# Patient Record
Sex: FEMALE | Race: BLACK OR AFRICAN AMERICAN
[De-identification: names, ages, dates, MRNs, and addresses within clinical notes are randomized per-mention and may not be internally consistent; named-entity substitution may affect disease eponyms.]

---

## 2017-08-12 ENCOUNTER — HOSPITAL ENCOUNTER (EMERGENCY)
Dept: HOSPITAL 61 - ER | Age: 42
LOS: 1 days | Discharge: HOME | End: 2017-08-13
Payer: SELF-PAY

## 2017-08-12 VITALS — WEIGHT: 120 LBS | HEIGHT: 60 IN | BODY MASS INDEX: 23.56 KG/M2

## 2017-08-12 DIAGNOSIS — R06.02: ICD-10-CM

## 2017-08-12 DIAGNOSIS — Z72.89: ICD-10-CM

## 2017-08-12 DIAGNOSIS — R00.0: ICD-10-CM

## 2017-08-12 DIAGNOSIS — R07.89: Primary | ICD-10-CM

## 2017-08-12 DIAGNOSIS — K29.70: ICD-10-CM

## 2017-08-12 DIAGNOSIS — I10: ICD-10-CM

## 2017-08-12 DIAGNOSIS — K21.9: ICD-10-CM

## 2017-08-12 LAB
ALBUMIN SERPL-MCNC: 4.5 G/DL (ref 3.4–5)
ALBUMIN/GLOB SERPL: 0.9 {RATIO} (ref 1–1.7)
ALP SERPL-CCNC: 58 U/L (ref 46–116)
ALT SERPL-CCNC: 90 U/L (ref 14–59)
ANION GAP SERPL CALC-SCNC: 16 MMOL/L (ref 6–14)
AST SERPL-CCNC: 102 U/L (ref 15–37)
BASOPHILS # BLD AUTO: 0 X10^3/UL (ref 0–0.2)
BASOPHILS NFR BLD: 1 % (ref 0–3)
BILIRUB SERPL-MCNC: 1.6 MG/DL (ref 0.2–1)
BUN SERPL-MCNC: 28 MG/DL (ref 7–20)
BUN/CREAT SERPL: 18 (ref 6–20)
CALCIUM SERPL-MCNC: 9.6 MG/DL (ref 8.5–10.1)
CHLORIDE SERPL-SCNC: 99 MMOL/L (ref 98–107)
CO2 SERPL-SCNC: 23 MMOL/L (ref 21–32)
CREAT SERPL-MCNC: 1.6 MG/DL (ref 0.6–1)
EOSINOPHIL NFR BLD: 1 % (ref 0–3)
ERYTHROCYTE [DISTWIDTH] IN BLOOD BY AUTOMATED COUNT: 14.7 % (ref 11.5–14.5)
GFR SERPLBLD BASED ON 1.73 SQ M-ARVRAT: 42.6 ML/MIN
GLOBULIN SER-MCNC: 5.1 G/DL (ref 2.2–3.8)
GLUCOSE SERPL-MCNC: 81 MG/DL (ref 70–99)
HCT VFR BLD CALC: 37.8 % (ref 36–47)
HGB BLD-MCNC: 13 G/DL (ref 12–15.5)
LYMPHOCYTES # BLD: 1.9 X10^3/UL (ref 1–4.8)
LYMPHOCYTES NFR BLD AUTO: 32 % (ref 24–48)
MCH RBC QN AUTO: 30 PG (ref 25–35)
MCHC RBC AUTO-ENTMCNC: 35 G/DL (ref 31–37)
MCV RBC AUTO: 88 FL (ref 79–100)
MONOCYTES NFR BLD: 16 % (ref 0–9)
NEUTROPHILS NFR BLD AUTO: 50 % (ref 31–73)
PLATELET # BLD AUTO: 384 X10^3/UL (ref 140–400)
POTASSIUM SERPL-SCNC: 4.3 MMOL/L (ref 3.5–5.1)
PROT SERPL-MCNC: 9.6 G/DL (ref 6.4–8.2)
RBC # BLD AUTO: 4.29 X10^6/UL (ref 3.5–5.4)
SODIUM SERPL-SCNC: 138 MMOL/L (ref 136–145)
WBC # BLD AUTO: 6.1 X10^3/UL (ref 4–11)

## 2017-08-12 PROCEDURE — 93005 ELECTROCARDIOGRAM TRACING: CPT

## 2017-08-12 PROCEDURE — 85025 COMPLETE CBC W/AUTO DIFF WBC: CPT

## 2017-08-12 PROCEDURE — 99285 EMERGENCY DEPT VISIT HI MDM: CPT

## 2017-08-12 PROCEDURE — 84484 ASSAY OF TROPONIN QUANT: CPT

## 2017-08-12 PROCEDURE — 36415 COLL VENOUS BLD VENIPUNCTURE: CPT

## 2017-08-12 PROCEDURE — 71010: CPT

## 2017-08-12 PROCEDURE — S0028 INJECTION, FAMOTIDINE, 20 MG: HCPCS

## 2017-08-12 PROCEDURE — 96374 THER/PROPH/DIAG INJ IV PUSH: CPT

## 2017-08-12 PROCEDURE — 83690 ASSAY OF LIPASE: CPT

## 2017-08-12 PROCEDURE — 80053 COMPREHEN METABOLIC PANEL: CPT

## 2017-08-13 VITALS — SYSTOLIC BLOOD PRESSURE: 127 MMHG | DIASTOLIC BLOOD PRESSURE: 75 MMHG

## 2017-08-13 NOTE — EKG
[facility]

              [address] [place] [address]

Test Date:    2017               Test Time:    22:05:57

Pat Name:     SUDHIR BASHIR          Department:   

Patient ID:   [id number]           Room:          

Gender:       F                        Technician:   

:          1975               Requested By: JAYLEN QUINTANA

Order Number: [id number]            Reading MD:     

                                 Measurements

Intervals                              Axis          

Rate:         121                      P:            3

MO:           108                      QRS:          6

QRSD:         62                       T:            34

QT:           314                                    

QTc:          449                                    

                           Interpretive Statements

SINUS TACHYCARDIA

LEFT ATRIAL ABNORMALITY

RI6.01          Unconfirmed report

No previous ECG available for comparison

## 2017-08-13 NOTE — PHYS DOC
Past Medical History


Past Medical History:  GERD, Hypertension


Additional Past Medical Histor:  daily etoh (1/2 bottle cognac)


Past Surgical History:  No Surgical History


Alcohol Use:  Heavy


Additional Information:  


drinks 1/2 bottle of cognac daily (08/2017)


Drug Use:  None





Adult General


Chief Complaint


Chief Complaint:  CHEST PAIN





HPI


HPI





Patient is a 42  year old female who presents here today complaining of chest 

pain that started approximate 7 PM. Patient has any shortness of breath. 

Patient portion had 4 episodes of nausea and vomiting. Patient describes the 

pain as midepigastric. Patient has any diarrhea. Patient has any coughing. 

Patient has any radiating pain to her jaw back or shoulders. Patient reports 

the pain is not exertional in nature. Patient reports no change with rest. 

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 

Patient reports no increase exertion. Patient reports that the pain started 

after eating [employer]. Patient reports she has a history of GERD, 

hypertension,. Patient denies any history of diabetes liver longer kidney pals. 

Patient has any prior strokes or MIs in the past. Patient has any surgeries. 

Patient reports that she drinks half a pint cognac every day. Patient reports 

that she did feel well today so did not drink or cognac except had one can of 

beer at approximately noon time. Patient reports the pain did not start in her 

midepigastric area up until 7 PM. Patient denies any no known drug allergies. 

Patient is not do any drugs. Patient reports she does smoke and does drink 

heavily. Patient has no history of seizures or alcohol withdrawal in the past.





Review of systems:


Constitutional: Denies fever or chills          


Eyes: Denies change in visual acuity, redness, or eye pain   


HENT: Denies nasal congestion or sore throat 


All other review systems are negative except as documented in the history of 

present illness portion.








Physical exam:


Constitutional: Well developed, well nourished, no acute distress, non-toxic 

appearance. 


HENT: Normocephalic, atraumatic, bilateral external ears normal, 


Eyes:  EOMI, conjunctiva normal, no discharge.  


Neck: Normal range of motion, no tenderness, supple, no stridor.  


Cardiovascular: Regular rate


Lungs & Thorax:  Bilateral breath sounds clear to auscultation no wheezing 

rales or rhonchi


Abdomen: Bowel sounds normal, soft, no tenderness, 


Skin: Warm, dry, no erythema, no rash.  


Back: No tenderness, no CVA tenderness.  


Extremities:  ROM intact, no edema.  


Neurologic: Alert and oriented X 3, normal motor function, normal sensory 

function, no focal deficits noted. 


Psychologic: Affect normal, judgement normal, mood normal. 





Patient's ER workup consisted of normal labs. 


Patient's EKG revealed normal sinus rhythm at a heart rate of 128 upon arrival 

to the ER. Patient nonspecific ST-T wave abnormality. There was no evidence of 

ST elevation MI. 


Patient had a repeat EKG performed at 12:34 AM. Patient's repeat EKG revealed 

sinus rhythm at a heart rate of 97 with nonspecific ST-T wave abnormalities. 

There was no evidence of ST elevation at that time. Both EKGs interpreted by ED 

physician.


Patient's initial troponin was 0.017. Patient's repeat troponin was also less 

than 0.017.





Patient's ER physical exam was remarkable for an initial tachycardia at a heart 

rate of 115. Patient's heart was regular rate and rhythm after being in the ED 

for approximately 30 minutes. Patient's heart rate came down to 92. Patient's 

lungs were clear without any wheezing rales or rhonchi. Patient does have 

reproducible tenderness to palpation to her mid sternum and left chest. Patient 

has reproducible tenderness to palpation to her midepigastric region. Patient 

has no right upper quadrant tenderness to palpation. Patient has no Ochoa 

sign. Patient's abdomen was soft nontender no rebound or guarding. Patient was 

alert awake and oriented 3. There were no signs or symptoms of alcohol 

withdrawal. Patient did not have any tremor.





While the ER the patient was given a GI cocktail which she reports immediately 

resolved her discomfort. Patient reports the pain she is having today is very 

similar to her GERD pain however it was more intense than usual. Patient 

reports the pain occurred right after they [employer] which is 

common for her to have reflux after eating fried food.





Patient was offered admission to the hospital however she feels that the pain 

that she's having is not consistent with her heart and she feels that she will 

be okay to go home. Patient reports that she had a stress test approximately 5 

years ago. I did discuss with the patient given the length of the stress test 

that it likely would not contribute to my feel comfortable sending her home. 

Utilizing shared decision making with the patient, we've agreed to repeat her 

blood tests and of her labs are okay she would still feel comfortable going 

home.





Assessment and plan





This is a 42-year-old female who presents to the ER with atypical chest pain. 

Patient is most consistent with her GERD-type pain. Patient is low risk for 

cardiac etiology for chest pain. Patient had repeat troponin and EKGs performed 

which did not show any changes. Patient feels much improved after the GI 

cocktail and feels comfortable going home. Patient be sent home with 

prescription for Prilosec to take and will be instructed to return the ER if 

she changes her mind or has any further concerns regarding her pain.





Current Medications


Current Medications





Current Medications








 Medications


  (Trade)  Dose


 Ordered  Sig/Hilaria  Start Time


 Stop Time Status Last Admin


Dose Admin


 


 Famotidine


  (Pepcid)  20 mg  1X  ONCE  8/12/17 23:30


 8/12/17 23:31 DC 8/12/17 23:47


20 MG


 


 Multi-Ingredient


 Mouthwash/Gargle


  (Gi Cocktail


 Single Dose)  15 ml  1X  ONCE  8/12/17 23:30


 8/12/17 23:31 DC 8/12/17 23:46


15 ML











Allergies


Allergies





Allergies








Coded Allergies Type Severity Reaction Last Updated Verified


 


  No Known Drug Allergies    1/29/14 No











Current Patient Data


Vital Signs





 Vital Signs








  Date Time  Temp Pulse Resp B/P (MAP) Pulse Ox O2 Delivery O2 Flow Rate FiO2


 


8/12/17 23:44  96 20 132/91 (105) 96 Room Air  


 


8/12/17 22:06 99.4       





 99.4       








Lab Values





 Laboratory Tests








Test


  8/12/17


23:05 8/13/17


00:37


 


White Blood Count


  6.1 x10^3/uL


(4.0-11.0) 


 


 


Red Blood Count


  4.29 x10^6/uL


(3.50-5.40) 


 


 


Hemoglobin


  13.0 g/dL


(12.0-15.5) 


 


 


Hematocrit


  37.8 %


(36.0-47.0) 


 


 


Mean Corpuscular Volume


  88 fL ()


  


 


 


Mean Corpuscular Hemoglobin 30 pg (25-35)   


 


Mean Corpuscular Hemoglobin


Concent 35 g/dL


(31-37) 


 


 


Red Cell Distribution Width


  14.7 %


(11.5-14.5)  H 


 


 


Platelet Count


  384 x10^3/uL


(140-400) 


 


 


Neutrophils (%) (Auto) 50 % (31-73)   


 


Lymphocytes (%) (Auto) 32 % (24-48)   


 


Monocytes (%) (Auto) 16 % (0-9)  H 


 


Eosinophils (%) (Auto) 1 % (0-3)   


 


Basophils (%) (Auto) 1 % (0-3)   


 


Neutrophils # (Auto)


  3.1 x10^3uL


(1.8-7.7) 


 


 


Lymphocytes # (Auto)


  1.9 x10^3/uL


(1.0-4.8) 


 


 


Monocytes # (Auto)


  1.0 x10^3/uL


(0.0-1.1) 


 


 


Eosinophils # (Auto)


  0.1 x10^3/uL


(0.0-0.7) 


 


 


Basophils # (Auto)


  0.0 x10^3/uL


(0.0-0.2) 


 


 


Sodium Level


  138 mmol/L


(136-145) 


 


 


Potassium Level


  4.3 mmol/L


(3.5-5.1) 


 


 


Chloride Level


  99 mmol/L


() 


 


 


Carbon Dioxide Level


  23 mmol/L


(21-32) 


 


 


Anion Gap 16 (6-14)  H 


 


Blood Urea Nitrogen


  28 mg/dL


(7-20)  H 


 


 


Creatinine


  1.6 mg/dL


(0.6-1.0)  H 


 


 


Estimated GFR


(Cockcroft-Gault) 42.6  


  


 


 


BUN/Creatinine Ratio 18 (6-20)   


 


Glucose Level


  81 mg/dL


(70-99) 


 


 


Calcium Level


  9.6 mg/dL


(8.5-10.1) 


 


 


Total Bilirubin


  1.6 mg/dL


(0.2-1.0)  H 


 


 


Aspartate Amino Transferase


(AST) 102 U/L


(15-37)  H 


 


 


Alanine Aminotransferase (ALT)


  90 U/L (14-59)


H 


 


 


Alkaline Phosphatase


  58 U/L


() 


 


 


Troponin I Quantitative


  < 0.017 ng/mL


(0.000-0.055) < 0.017 ng/mL


(0.000-0.055)


 


Total Protein


  9.6 g/dL


(6.4-8.2)  H 


 


 


Albumin


  4.5 g/dL


(3.4-5.0) 


 


 


Albumin/Globulin Ratio


  0.9 (1.0-1.7)


L 


 


 


Lipase


  126 U/L


() 


 


 


Ethyl Alcohol Level


  < 10 mg/dL


(0-10) 


 





 Laboratory Tests


8/12/17 23:05








 Laboratory Tests


8/12/17 23:05














EKG


EKG


[]





Radiology/Procedures


Radiology/Procedures


[]





Course & Med Decision Making


Course & Med Decision Making


Pertinent Labs and Imaging studies reviewed. (See chart for details)





[]





Dragon Disclaimer


Dragon Disclaimer


This electronic medical record was generated, in whole or in part, using a 

voice recognition dictation system.





Departure


Departure


Impression:  


 Primary Impression:  


 Abdominal pain


 Additional Impressions:  


 Nonspecific chest pain


 Gastritis


 Alcohol use disorder


Disposition:  01 HOME, SELF-CARE


Condition:  IMPROVED


Referrals:  


UNKNOWN PCP NAME (PCP)


Patient Instructions:  Alcoholic Gastritis-Brief, Chest Pain (Nonspecific), 

Diet for Gastroesophageal Reflux Disease, Adult, Gastritis, Adult





Additional Instructions:  


Please follow up with your family doctor for further evaluation of your pain 

and for referral to an alcohol rehabilitation facility. He needs to stop 

drinking so much alcohol. He change her diet. Given given instructions 

regarding diet for gastroesophageal reflux disorder.


Scripts


Omeprazole Magnesium (PRILOSEC OTC) 20 Mg Tablet.dr


1 TAB PO DAILY, #30 TAB 3 Refills


   Prov: JAYLEN QUINTANA MD         8/13/17





Problem Qualifiers











JAYLEN QUINTANA MD Aug 13, 2017 01:41

## 2017-08-13 NOTE — RAD
AP portable chest  radiograph August 12, 2017



Clinical History: Chest pain.



An AP portable erect digital radiograph of the chest was obtained. Comparison

study is dated 6/8/2012.



The cardiac silhouette is normal in size. The thoracic aorta is mildly

tortuous. No acute pulmonary infiltrate is seen. No pleural effusion or

pneumothorax is noted. The osseous structures are grossly intact.



Impression: No acute abnormality is seen.

## 2017-08-14 NOTE — EKG
[facility]

               [address] [place] [address]

Test Date:    2017               Test Time:    00:34:36

Pat Name:     SUDHIR BASHIR          Department:   

Patient ID:   [id number]           Room:          

Gender:       F                        Technician:   

:          1975               Requested By: JAYLEN QUINTANA

Order Number: [id number]            Reading MD:     

                                 Measurements

Intervals                              Axis          

Rate:         97                       P:            29

TN:           130                      QRS:          -19

QRSD:         66                       T:            20

QT:           344                                    

QTc:          441                                    

                           Interpretive Statements

SINUS RHYTHM

LEFT ATRIAL ABNORMALITY

LEFTWARD AXIS

R-S TRANSITION ZONE IN V LEADS DISPLACED TO THE LEFT

RI6.01          Unconfirmed report

No previous ECG available for comparison

## 2017-08-14 NOTE — EKG
[facility]

               [address] [place] [address]

Test Date:    2017               Test Time:    22:07:07

Pat Name:     SUDHIR BASHIR          Department:   

Patient ID:   [id number]           Room:          

Gender:       F                        Technician:   

:          1975               Requested By: JAYLEN QUINTANA

Order Number: [id number]            Reading MD:     

                                 Measurements

Intervals                              Axis          

Rate:         128                      P:            -31

NE:           102                      QRS:          4

QRSD:         60                       T:            39

QT:           308                                    

QTc:          453                                    

                           Interpretive Statements

SINUS TACHYCARDIA

RI6.01          Unconfirmed report

No previous ECG available for comparison

## 2020-07-12 ENCOUNTER — HOSPITAL ENCOUNTER (EMERGENCY)
Dept: HOSPITAL 61 - ER | Age: 45
Discharge: HOME | End: 2020-07-12
Payer: SELF-PAY

## 2020-07-12 ENCOUNTER — HOSPITAL ENCOUNTER (EMERGENCY)
Dept: HOSPITAL 61 - ER | Age: 45
Discharge: LEFT BEFORE DISCHARGE | End: 2020-07-12
Payer: SELF-PAY

## 2020-07-12 VITALS — BODY MASS INDEX: 24.97 KG/M2 | HEIGHT: 60 IN | WEIGHT: 127.21 LBS

## 2020-07-12 VITALS — DIASTOLIC BLOOD PRESSURE: 89 MMHG | SYSTOLIC BLOOD PRESSURE: 135 MMHG

## 2020-07-12 VITALS — BODY MASS INDEX: 25.97 KG/M2 | WEIGHT: 132.28 LBS | HEIGHT: 60 IN

## 2020-07-12 VITALS — DIASTOLIC BLOOD PRESSURE: 91 MMHG | SYSTOLIC BLOOD PRESSURE: 141 MMHG

## 2020-07-12 DIAGNOSIS — E87.6: ICD-10-CM

## 2020-07-12 DIAGNOSIS — R00.0: ICD-10-CM

## 2020-07-12 DIAGNOSIS — F41.9: Primary | ICD-10-CM
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
## 2020-07-12 DIAGNOSIS — I10: ICD-10-CM
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
## 2020-07-12 DIAGNOSIS — F17.200: ICD-10-CM

## 2020-07-12 DIAGNOSIS — F10.20: ICD-10-CM

## 2020-07-12 DIAGNOSIS — K21.9: ICD-10-CM

## 2020-07-12 DIAGNOSIS — Z53.21: ICD-10-CM

## 2020-07-12 DIAGNOSIS — F10.129: ICD-10-CM

## 2020-07-12 DIAGNOSIS — R06.4: Primary | ICD-10-CM

## 2020-07-12 LAB
ALBUMIN SERPL-MCNC: 4.1 G/DL (ref 3.4–5)
ALBUMIN/GLOB SERPL: 0.8 {RATIO} (ref 1–1.7)
ALP SERPL-CCNC: 93 U/L (ref 46–116)
ALT SERPL-CCNC: 45 U/L (ref 14–59)
ANION GAP SERPL CALC-SCNC: 15 MMOL/L (ref 6–14)
AST SERPL-CCNC: 42 U/L (ref 15–37)
BASOPHILS # BLD AUTO: 0.1 X10^3/UL (ref 0–0.2)
BASOPHILS NFR BLD: 1 % (ref 0–3)
BILIRUB SERPL-MCNC: 1.2 MG/DL (ref 0.2–1)
BUN SERPL-MCNC: 15 MG/DL (ref 7–20)
BUN/CREAT SERPL: 13 (ref 6–20)
CALCIUM SERPL-MCNC: 9.9 MG/DL (ref 8.5–10.1)
CHLORIDE SERPL-SCNC: 96 MMOL/L (ref 98–107)
CO2 SERPL-SCNC: 25 MMOL/L (ref 21–32)
CREAT SERPL-MCNC: 1.2 MG/DL (ref 0.6–1)
EOSINOPHIL NFR BLD: 0.1 X10^3/UL (ref 0–0.7)
EOSINOPHIL NFR BLD: 1 % (ref 0–3)
ERYTHROCYTE [DISTWIDTH] IN BLOOD BY AUTOMATED COUNT: 13.9 % (ref 11.5–14.5)
GFR SERPLBLD BASED ON 1.73 SQ M-ARVRAT: 58.5 ML/MIN
GLOBULIN SER-MCNC: 5.3 G/DL (ref 2.2–3.8)
GLUCOSE SERPL-MCNC: 91 MG/DL (ref 70–99)
HCT VFR BLD CALC: 42.7 % (ref 36–47)
HGB BLD-MCNC: 14.9 G/DL (ref 12–15.5)
LIPASE: 66 U/L (ref 73–393)
LYMPHOCYTES # BLD: 2.1 X10^3/UL (ref 1–4.8)
LYMPHOCYTES NFR BLD AUTO: 22 % (ref 24–48)
MCH RBC QN AUTO: 30 PG (ref 25–35)
MCHC RBC AUTO-ENTMCNC: 35 G/DL (ref 31–37)
MCV RBC AUTO: 86 FL (ref 79–100)
MONO #: 1.2 X10^3/UL (ref 0–1.1)
MONOCYTES NFR BLD: 13 % (ref 0–9)
NEUT #: 6 X10^3/UL (ref 1.8–7.7)
NEUTROPHILS NFR BLD AUTO: 64 % (ref 31–73)
PLATELET # BLD AUTO: 481 X10^3/UL (ref 140–400)
POTASSIUM SERPL-SCNC: 3.3 MMOL/L (ref 3.5–5.1)
PROT SERPL-MCNC: 9.4 G/DL (ref 6.4–8.2)
RBC # BLD AUTO: 4.97 X10^6/UL (ref 3.5–5.4)
SODIUM SERPL-SCNC: 136 MMOL/L (ref 136–145)
WBC # BLD AUTO: 9.5 X10^3/UL (ref 4–11)

## 2020-07-12 PROCEDURE — 83690 ASSAY OF LIPASE: CPT

## 2020-07-12 PROCEDURE — 80053 COMPREHEN METABOLIC PANEL: CPT

## 2020-07-12 PROCEDURE — 96376 TX/PRO/DX INJ SAME DRUG ADON: CPT

## 2020-07-12 PROCEDURE — 85025 COMPLETE CBC W/AUTO DIFF WBC: CPT

## 2020-07-12 PROCEDURE — 96374 THER/PROPH/DIAG INJ IV PUSH: CPT

## 2020-07-12 PROCEDURE — 99285 EMERGENCY DEPT VISIT HI MDM: CPT

## 2020-07-12 PROCEDURE — 36415 COLL VENOUS BLD VENIPUNCTURE: CPT

## 2020-07-12 NOTE — PHYS DOC
Past Medical History


Past Medical History:  GERD, Hypertension


Additional Past Medical Histor:  daily etoh (1/2 bottle cognac)


Past Surgical History:  No Surgical History


Smoking Status:  Current Every Day Smoker


Alcohol Use:  Heavy


Drug Use:  None





General Adult


EDM:


Chief Complaint:  ANXIETY/PANIC ATTACK





HPI:


HPI:





Patient is a 45  year old female presents for the evaluation of anxiety and 

muscle spasms.  Patient states muscle spasms onset this afternoon bilateral 

hands.





Patient is a daily drinker.  She states her last alcoholic drink was around 4 AM

this morning.





On exam patient's heart rate is in the 120s.  She does not currently have any 

shakes or spasms.








Patient tells me she has no interest to stop drinking.





Review of Systems:


Review of Systems:


Constitutional:   Denies fever or chills. []


Eyes:   Denies change in visual acuity. []


HENT:   Denies nasal congestion or sore throat. [] 


Respiratory:   Denies cough or shortness of breath. [] 


Cardiovascular:   Denies chest pain or edema. [] 


GI:   Denies abdominal pain, nausea, vomiting, bloody stools or diarrhea. [] 


:  Denies dysuria. [] 


Musculoskeletal:   Denies back pain or joint pain. [] 


Integument:   Denies rash. [] 


Neurologic:   Denies headache, focal weakness or sensory changes. [] 


Endocrine:   Denies polyuria or polydipsia. [] 


Lymphatic:  Denies swollen glands. [] 


Psychiatric:  Denies depression or anxiety. []





Heart Score:


Risk Factors:


Risk Factors:  DM, Current or recent (<one month) smoker, HTN, HLP, family 

history of CAD, obesity.


Risk Scores:


Score 0 - 3:  2.5% MACE over next 6 weeks - Discharge Home


Score 4 - 6:  20.3% MACE over next 6 weeks - Admit for Clinical Observation


Score 7 - 10:  72.7% MACE over next 6 weeks - Early Invasive Strategies





Current Medications:





Current Medications








 Medications


  (Trade)  Dose


 Ordered  Sig/Hilaria  Start Time


 Stop Time Status Last Admin


Dose Admin


 


 Lorazepam


  (Ativan Inj)  1 mg  1X  ONCE  20 19:30


 20 19:31 DC 20 19:24


1 MG


 


 Sodium Chloride  1,000 ml @ 


 1,000 mls/hr  1X  ONCE  20 19:00


 20 19:59 DC 20 19:00


1,000 MLS/HR











Allergies:


Allergies:





Allergies








Coded Allergies Type Severity Reaction Last Updated Verified


 


  No Known Drug Allergies    14 No











Physical Exam:


PE:





Constitutional: Well developed, well nourished, no acute distress, non-toxic 

appearance. []


HENT: Normocephalic, atraumatic, bilateral external ears normal, oropharynx 

moist, no oral exudates, nose normal. []


Eyes: , EOMI, conjunctiva normal, no discharge. [] 


Neck: Normal range of motion, no tenderness, supple, no stridor. [] 


Cardiovascular: Tachycardia


Lungs & Thorax:  Bilateral breath sounds clear to auscultation []


Abdomen: Bowel sounds normal, soft, no tenderness, no masses, no pulsatile 

masses. [] 


Skin: Warm, dry, no erythema, no rash. [] 


Back: No tenderness, no CVA tenderness. [] 


Extremities: No tenderness, no cyanosis, no clubbing, ROM intact, no edema. [] 


Neurologic: Alert and oriented X 3, normal motor function, normal sensory 

function, no focal deficits noted. []


Psychologic: Affect normal, judgement normal, mood normal. []





Current Patient Data:


Labs:





                                Laboratory Tests








Test


 20


18:50


 


White Blood Count


 9.5 x10^3/uL


(4.0-11.0)


 


Red Blood Count


 4.97 x10^6/uL


(3.50-5.40)


 


Hemoglobin


 14.9 g/dL


(12.0-15.5)


 


Hematocrit


 42.7 %


(36.0-47.0)


 


Mean Corpuscular Volume


 86 fL ()





 


Mean Corpuscular Hemoglobin 30 pg (25-35)  


 


Mean Corpuscular Hemoglobin


Concent 35 g/dL


(31-37)


 


Red Cell Distribution Width


 13.9 %


(11.5-14.5)


 


Platelet Count


 481 x10^3/uL


(140-400)  H


 


Neutrophils (%) (Auto) 64 % (31-73)  


 


Lymphocytes (%) (Auto) 22 % (24-48)  L


 


Monocytes (%) (Auto) 13 % (0-9)  H


 


Eosinophils (%) (Auto) 1 % (0-3)  


 


Basophils (%) (Auto) 1 % (0-3)  


 


Neutrophils # (Auto)


 6.0 x10^3/uL


(1.8-7.7)


 


Lymphocytes # (Auto)


 2.1 x10^3/uL


(1.0-4.8)


 


Monocytes # (Auto)


 1.2 x10^3/uL


(0.0-1.1)  H


 


Eosinophils # (Auto)


 0.1 x10^3/uL


(0.0-0.7)


 


Basophils # (Auto)


 0.1 x10^3/uL


(0.0-0.2)


 


Sodium Level


 136 mmol/L


(136-145)


 


Potassium Level


 3.3 mmol/L


(3.5-5.1)  L


 


Chloride Level


 96 mmol/L


()  L


 


Carbon Dioxide Level


 25 mmol/L


(21-32)


 


Anion Gap 15 (6-14)  H


 


Blood Urea Nitrogen


 15 mg/dL


(7-20)


 


Creatinine


 1.2 mg/dL


(0.6-1.0)  H


 


Estimated GFR


(Cockcroft-Gault) 58.5  





 


BUN/Creatinine Ratio 13 (6-20)  


 


Glucose Level


 91 mg/dL


(70-99)


 


Calcium Level


 9.9 mg/dL


(8.5-10.1)


 


Total Bilirubin


 1.2 mg/dL


(0.2-1.0)  H


 


Aspartate Amino Transferase


(AST) 42 U/L (15-37)


H


 


Alanine Aminotransferase (ALT)


 45 U/L (14-59)





 


Alkaline Phosphatase


 93 U/L


()


 


Total Protein


 9.4 g/dL


(6.4-8.2)  H


 


Albumin


 4.1 g/dL


(3.4-5.0)


 


Albumin/Globulin Ratio


 0.8 (1.0-1.7)


L


 


Lipase


 66 U/L


()  L


 


Ethyl Alcohol Level


 < 10 mg/dL


(0-10)





                                Laboratory Tests


20 18:50








                                Laboratory Tests


20 18:50








Vital Signs:





                                   Vital Signs








  Date Time  Temp Pulse Resp B/P (MAP) Pulse Ox O2 Delivery O2 Flow Rate FiO2


 


20 18:46 99.7 136 20 147/90 (109) 98 Room Air  





 99.7       











EKG:


EKhrs


heart rate 130


sinus tachycardia


[]





Radiology/Procedures:


Radiology/Procedures:


[]





Course & Med Decision Making:


Course & Med Decision Making


Pertinent Labs and Imaging studies reviewed. (See chart for details)





[] Was evaluated for chief complaint.  Work-up consisted of laboratory analysis 

and EKG.  Results reviewed and discussed with patient.  Patient noted to have 

several abnormal labs including a low potassium and slightly elevated 

creatinine.


Patient alcohol level was less than 10.  Patient states the last time she drank 

was 4 AM this morning.  EKG sinus tachycardia.  Patient denies any chest pain.  

Treatment included 2 L of IV fluids and a total of 2 mg of Ativan.


Reevaluation patient tells me she has no interest in not drinking anymore.  Her 

heart rate is improved to the 110s.  Patient is no longer having spasms of his 

of her hands.


Did discuss the possibility of her signs and symptoms being related to alcohol 

withdrawal.





Dragon Disclaimer:


Dragon Disclaimer:


This electronic medical record was generated, in whole or in part, using a voice

 recognition dictation system.





Departure


Departure


Impression:  


   Primary Impression:  


   Anxiety


   Additional Impressions:  


   Hypokalemia


   Alcohol abuse


Disposition:  01 HOME, SELF-CARE


Referrals:  


UNKNOWN PCP NAME (PCP)


Patient Instructions:  Anxiety and Panic Attacks, Hypokalemia


Scripts


Lorazepam (ATIVAN) 1 Mg Tablet


1 MG PO TID, #14 TAB


   Prov: PJ MAE DO         20 


Potassium Chloride (POTASSIUM CHLORIDE **) 20 Meq Tablet.er


20 MEQ PO DAILY for SUPPLEMENT for 10 Days, #10 TAB.SR


   Prov: PJ MAE DO         20





Justicifation of Admission Dx:


Justifications for Admission:


Justification of Admission Dx:  N/A











PJ MAE DO            2020 20:26

## 2020-08-16 ENCOUNTER — HOSPITAL ENCOUNTER (EMERGENCY)
Dept: HOSPITAL 61 - ER | Age: 45
Discharge: HOME | End: 2020-08-16
Payer: SELF-PAY

## 2020-08-16 VITALS — WEIGHT: 125.66 LBS | HEIGHT: 60 IN | BODY MASS INDEX: 24.67 KG/M2

## 2020-08-16 VITALS — SYSTOLIC BLOOD PRESSURE: 132 MMHG | DIASTOLIC BLOOD PRESSURE: 95 MMHG

## 2020-08-16 DIAGNOSIS — F10.10: ICD-10-CM

## 2020-08-16 DIAGNOSIS — S01.511D: Primary | ICD-10-CM

## 2020-08-16 DIAGNOSIS — F41.9: ICD-10-CM

## 2020-08-16 DIAGNOSIS — F17.200: ICD-10-CM

## 2020-08-16 DIAGNOSIS — K21.9: ICD-10-CM
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
## 2020-08-16 DIAGNOSIS — I10: ICD-10-CM

## 2020-08-16 PROCEDURE — 99282 EMERGENCY DEPT VISIT SF MDM: CPT

## 2020-08-16 NOTE — PHYS DOC
Past Medical History


Past Medical History:  Anxiety, GERD, Hypertension


Additional Past Medical Histor:  daily etoh (1/2 bottle cognac)


Past Surgical History:  No Surgical History


Smoking Status:  Current Every Day Smoker


Alcohol Use:  Heavy


Drug Use:  None





General Adult


EDM:


Chief Complaint:  SUTURE/STAPLE REMOVAL





HPI:


HPI:





Patient is a 45  year old AA female who presents to the emergency department for

suture removal.  She reports that she had 6 or 7 sutures placed in her left 

upper lip a week ago at [facility].  She denies any fever, drainage, 

bleeding, redness, or warmth at the incision site.  She currently denies any 

pain.





Review of Systems:


Review of Systems:


Constitutional:   Denies fever or chills. []


Integument:   See HPI


Neurologic:   Denies headache


Psychiatric:  Denies depression or anxiety. []





Heart Score:


Risk Factors:


Risk Factors:  DM, Current or recent (<one month) smoker, HTN, HLP, family 

history of CAD, obesity.


Risk Scores:


Score 0 - 3:  2.5% MACE over next 6 weeks - Discharge Home


Score 4 - 6:  20.3% MACE over next 6 weeks - Admit for Clinical Observation


Score 7 - 10:  72.7% MACE over next 6 weeks - Early Invasive Strategies





Allergies:


Allergies:





Allergies








Coded Allergies Type Severity Reaction Last Updated Verified


 


  No Known Drug Allergies    1/29/14 No











Physical Exam:


PE:





Constitutional: Well developed, well nourished, no acute distress, non-toxic 

appearance. []


HENT: Normocephalic, atraumatic, bilateral external ears normal, nose normal. []


Eyes: PERRLA, EOMI, conjunctiva normal, no discharge. [] 


Neck: Normal range of motion, no stridor. [] 


Cardiovascular:Heart rate regular rhythm


Lungs & Thorax:  Respirations even and unlabored, no retractions, no respiratory

 distress


Skin: Warm, dry, no erythema, no rash; healed laceration to left upper lip 

without surrounding erythema, warmth, drainage, or bleeding.  Edges are well 

approximated, 6 sutures in place.  [] 


Extremities: No cyanosis, ROM intact, no edema. [] 


Neurologic: Alert and oriented X 3, no focal deficits noted. []


Psychologic: Affect normal, judgement normal, mood normal. []





Current Patient Data:


Vital Signs:





                                   Vital Signs








  Date Time  Temp Pulse Resp B/P (MAP) Pulse Ox O2 Delivery O2 Flow Rate FiO2


 


8/16/20 14:03 98.9 108 16 132/95 (107) 94 Room Air  





 98.9       











EKG:


EKG:


[]





Radiology/Procedures:


Radiology/Procedures:


6 sutures were [] removed from wound. The wound was well approximated before and

 after procedure. Patient tolerated the procedure well. There is some mild 

crusting about the wound and no discharge or bleeding from the wound. []





Course & Med Decision Making:


Course & Med Decision Making


Pertinent Labs and Imaging studies reviewed. (See chart for details)





[]





Dragon Disclaimer:


Dragon Disclaimer:


This electronic medical record was generated, in whole or in part, using a voice

 recognition dictation system.





Departure


Departure


Impression:  


   Primary Impression:  


   Encounter for removal of sutures


Disposition:  01 HOME, SELF-CARE


Condition:  STABLE


Referrals:  


UNKNOWN PCP NAME (PCP)


Patient Instructions:  Suture Removal-Brief





Additional Instructions:  


Be sure to apply sunblock to the affected area to help reduce scarring, you may 

also purchase some vitamin E capsules and put vitamin E over the affected area 

twice a day to help reduce scarring.  Follow-up with your primary care doctor as

 needed.  Return to the ER if symptoms worsen or you develop a fever.





Justicifation of Admission Dx:


Justifications for Admission:


Justification of Admission Dx:  N/A











JOSÉ LUIS SPICER       Aug 16, 2020 14:25

## 2022-01-31 ENCOUNTER — HOSPITAL ENCOUNTER (OUTPATIENT)
Dept: HOSPITAL 61 - ER | Age: 47
Setting detail: OBSERVATION
LOS: 1 days | Discharge: HOME | End: 2022-02-01
Attending: INTERNAL MEDICINE | Admitting: INTERNAL MEDICINE
Payer: SELF-PAY

## 2022-01-31 VITALS — SYSTOLIC BLOOD PRESSURE: 142 MMHG | DIASTOLIC BLOOD PRESSURE: 92 MMHG

## 2022-01-31 VITALS — DIASTOLIC BLOOD PRESSURE: 94 MMHG | SYSTOLIC BLOOD PRESSURE: 161 MMHG

## 2022-01-31 VITALS — HEIGHT: 60 IN | WEIGHT: 130.07 LBS | BODY MASS INDEX: 25.54 KG/M2

## 2022-01-31 DIAGNOSIS — I10: ICD-10-CM

## 2022-01-31 DIAGNOSIS — I20.9: ICD-10-CM

## 2022-01-31 DIAGNOSIS — R79.89: ICD-10-CM

## 2022-01-31 DIAGNOSIS — Z79.899: ICD-10-CM

## 2022-01-31 DIAGNOSIS — Z98.890: ICD-10-CM

## 2022-01-31 DIAGNOSIS — R07.89: Primary | ICD-10-CM

## 2022-01-31 DIAGNOSIS — F17.210: ICD-10-CM

## 2022-01-31 DIAGNOSIS — E83.42: ICD-10-CM

## 2022-01-31 DIAGNOSIS — F10.10: ICD-10-CM

## 2022-01-31 DIAGNOSIS — K21.9: ICD-10-CM

## 2022-01-31 DIAGNOSIS — F41.9: ICD-10-CM

## 2022-01-31 DIAGNOSIS — Z20.822: ICD-10-CM

## 2022-01-31 DIAGNOSIS — E87.6: ICD-10-CM

## 2022-01-31 DIAGNOSIS — G47.00: ICD-10-CM

## 2022-01-31 LAB
ALBUMIN SERPL-MCNC: 3.5 G/DL (ref 3.4–5)
ALBUMIN/GLOB SERPL: 0.7 {RATIO} (ref 1–1.7)
ALP SERPL-CCNC: 85 U/L (ref 46–116)
ALT SERPL-CCNC: 106 U/L (ref 14–59)
AMPHETAMINE/METHAMPHETAMINE: (no result)
ANION GAP SERPL CALC-SCNC: 11 MMOL/L (ref 6–14)
APTT PPP: YELLOW S
AST SERPL-CCNC: 92 U/L (ref 15–37)
BACTERIA #/AREA URNS HPF: (no result) /HPF
BARBITURATES UR-MCNC: (no result) UG/ML
BASOPHILS # BLD AUTO: 0 X10^3/UL (ref 0–0.2)
BASOPHILS NFR BLD: 1 % (ref 0–3)
BENZODIAZ UR-MCNC: (no result) UG/L
BILIRUB SERPL-MCNC: 1.4 MG/DL (ref 0.2–1)
BILIRUB UR QL STRIP: NEGATIVE
BUN SERPL-MCNC: 9 MG/DL (ref 7–20)
BUN/CREAT SERPL: 13 (ref 6–20)
CALCIUM SERPL-MCNC: 8.6 MG/DL (ref 8.5–10.1)
CANNABINOIDS UR-MCNC: (no result) UG/L
CHLORIDE SERPL-SCNC: 96 MMOL/L (ref 98–107)
CO2 SERPL-SCNC: 28 MMOL/L (ref 21–32)
COCAINE UR-MCNC: (no result) NG/ML
CREAT SERPL-MCNC: 0.7 MG/DL (ref 0.6–1)
EOSINOPHIL NFR BLD: 0.1 X10^3/UL (ref 0–0.7)
EOSINOPHIL NFR BLD: 3 % (ref 0–3)
ERYTHROCYTE [DISTWIDTH] IN BLOOD BY AUTOMATED COUNT: 14.5 % (ref 11.5–14.5)
FIBRINOGEN PPP-MCNC: CLEAR MG/DL
GFR SERPLBLD BASED ON 1.73 SQ M-ARVRAT: 108.1 ML/MIN
GLUCOSE SERPL-MCNC: 99 MG/DL (ref 70–99)
HCT VFR BLD CALC: 44.8 % (ref 36–47)
HGB BLD-MCNC: 14.6 G/DL (ref 12–15.5)
LIPASE: 68 U/L (ref 73–393)
LYMPHOCYTES # BLD: 1.7 X10^3/UL (ref 1–4.8)
LYMPHOCYTES NFR BLD AUTO: 41 % (ref 24–48)
MAGNESIUM SERPL-MCNC: 1.5 MG/DL (ref 1.8–2.4)
MCH RBC QN AUTO: 29 PG (ref 25–35)
MCHC RBC AUTO-ENTMCNC: 33 G/DL (ref 31–37)
MCV RBC AUTO: 89 FL (ref 79–100)
METHADONE SERPL-MCNC: (no result) NG/ML
MONO #: 0.7 X10^3/UL (ref 0–1.1)
MONOCYTES NFR BLD: 16 % (ref 0–9)
NEUT #: 1.6 X10^3/UL (ref 1.8–7.7)
NEUTROPHILS NFR BLD AUTO: 39 % (ref 31–73)
NITRITE UR QL STRIP: NEGATIVE
OPIATES UR-MCNC: (no result) NG/ML
PCP SERPL-MCNC: (no result) MG/DL
PH UR STRIP: 7.5 [PH]
PLATELET # BLD AUTO: 291 X10^3/UL (ref 140–400)
POTASSIUM SERPL-SCNC: 2.5 MMOL/L (ref 3.5–5.1)
PROT SERPL-MCNC: 8.8 G/DL (ref 6.4–8.2)
PROT UR STRIP-MCNC: NEGATIVE MG/DL
RBC # BLD AUTO: 5.04 X10^6/UL (ref 3.5–5.4)
RBC #/AREA URNS HPF: (no result) /HPF (ref 0–2)
SODIUM SERPL-SCNC: 135 MMOL/L (ref 136–145)
UROBILINOGEN UR-MCNC: 1 MG/DL
WBC # BLD AUTO: 4.1 X10^3/UL (ref 4–11)
WBC #/AREA URNS HPF: (no result) /HPF (ref 0–4)

## 2022-01-31 PROCEDURE — 83880 ASSAY OF NATRIURETIC PEPTIDE: CPT

## 2022-01-31 PROCEDURE — 83690 ASSAY OF LIPASE: CPT

## 2022-01-31 PROCEDURE — 85025 COMPLETE CBC W/AUTO DIFF WBC: CPT

## 2022-01-31 PROCEDURE — 93306 TTE W/DOPPLER COMPLETE: CPT

## 2022-01-31 PROCEDURE — 80061 LIPID PANEL: CPT

## 2022-01-31 PROCEDURE — 71045 X-RAY EXAM CHEST 1 VIEW: CPT

## 2022-01-31 PROCEDURE — 99285 EMERGENCY DEPT VISIT HI MDM: CPT

## 2022-01-31 PROCEDURE — 96372 THER/PROPH/DIAG INJ SC/IM: CPT

## 2022-01-31 PROCEDURE — 87086 URINE CULTURE/COLONY COUNT: CPT

## 2022-01-31 PROCEDURE — G0378 HOSPITAL OBSERVATION PER HR: HCPCS

## 2022-01-31 PROCEDURE — 96368 THER/DIAG CONCURRENT INF: CPT

## 2022-01-31 PROCEDURE — 96367 TX/PROPH/DG ADDL SEQ IV INF: CPT

## 2022-01-31 PROCEDURE — 80307 DRUG TEST PRSMV CHEM ANLYZR: CPT

## 2022-01-31 PROCEDURE — C8929 TTE W OR WO FOL WCON,DOPPLER: HCPCS

## 2022-01-31 PROCEDURE — U0003 INFECTIOUS AGENT DETECTION BY NUCLEIC ACID (DNA OR RNA); SEVERE ACUTE RESPIRATORY SYNDROME CORONAVIRUS 2 (SARS-COV-2) (CORONAVIRUS DISEASE [COVID-19]), AMPLIFIED PROBE TECHNIQUE, MAKING USE OF HIGH THROUGHPUT TECHNOLOGIES AS DESCRIBED BY CMS-2020-01-R: HCPCS

## 2022-01-31 PROCEDURE — 80053 COMPREHEN METABOLIC PANEL: CPT

## 2022-01-31 PROCEDURE — 87426 SARSCOV CORONAVIRUS AG IA: CPT

## 2022-01-31 PROCEDURE — 96365 THER/PROPH/DIAG IV INF INIT: CPT

## 2022-01-31 PROCEDURE — G0379 DIRECT REFER HOSPITAL OBSERV: HCPCS

## 2022-01-31 PROCEDURE — 81001 URINALYSIS AUTO W/SCOPE: CPT

## 2022-01-31 PROCEDURE — 84484 ASSAY OF TROPONIN QUANT: CPT

## 2022-01-31 PROCEDURE — 80048 BASIC METABOLIC PNL TOTAL CA: CPT

## 2022-01-31 PROCEDURE — 93005 ELECTROCARDIOGRAM TRACING: CPT

## 2022-01-31 PROCEDURE — 83735 ASSAY OF MAGNESIUM: CPT

## 2022-01-31 PROCEDURE — 36415 COLL VENOUS BLD VENIPUNCTURE: CPT

## 2022-01-31 PROCEDURE — 96366 THER/PROPH/DIAG IV INF ADDON: CPT

## 2022-01-31 PROCEDURE — 84443 ASSAY THYROID STIM HORMONE: CPT

## 2022-01-31 NOTE — PHYS DOC
Past Medical History


Past Medical History:  Anxiety, GERD, Hypertension


Additional Past Medical Histor:  daily etoh (1/2 bottle cognac)


Past Surgical History:  No Surgical History


Smoking Status:  Current Every Day Smoker


Alcohol Use:  Heavy


Drug Use:  None





General Adult


EDM:


Chief Complaint:  CHEST PAIN





HPI:


HPI:





Patient is a 47  year old female who presents with left-sided chest "aching" 

that radiates to the left arm since last night that is intermittent.  Patient 

currently denies any pain.  She states that she is not really short of breath bu

t states that she is breathing heavier.  Patient is mother has had a stent and 

CHF, and aunt, cousin had CHF.  Patient denies nausea, vomiting, abdominal pain,

shortness of breath, syncope, dizziness, fever, cough, urinary symptoms, back 

pain, numbness or tingling or focal weakness, vision changes.  She has not taken

aspirin today.  Patient has a history of hypertension, GERD, anxiety, smoking 

and drinks daily Cognac half a bottle.  Patient is not vaccinated for Covid.





Review of Systems:


Review of Systems:


Constitutional:   Denies fever or chills. []


Eyes:   Denies change in visual acuity. []


HENT:   Denies nasal congestion or sore throat. [] 


Respiratory:   Denies cough or shortness of breath. [] 


Cardiovascular:   Denies chest pain or edema. [] 


GI:   Denies abdominal pain, nausea, vomiting, bloody stools or diarrhea. [] 


:  Denies dysuria. [] 


Musculoskeletal:   Denies back pain or joint pain. [] 


Integument:   Denies rash. [] 


Neurologic:   Denies headache, focal weakness or sensory changes. [] 


Endocrine:   Denies polyuria or polydipsia. [] 


Lymphatic:  Denies swollen glands. [] 


Psychiatric:  Denies depression or anxiety. []





Heart Score:


C/O Chest Pain:  Yes


HEART Score for Chest Pain:  








HEART Score for Chest Pain Response (Comments) Value


 


History Slighlty/Non-Suspicious 0


 


ECG Nonspecific Repolarizatio 1


 


Age >45 - < 65 1


 


Risk Factors >3 Risk Factors or Hx CAD 2


 


Troponin < Normal Limit 0


 


Total  4








Risk Factors:


Risk Factors:  DM, Current or recent (<one month) smoker, HTN, HLP, family 

history of CAD, obesity.


Risk Scores:


Score 0 - 3:  2.5% MACE over next 6 weeks - Discharge Home


Score 4 - 6:  20.3% MACE over next 6 weeks - Admit for Clinical Observation


Score 7 - 10:  72.7% MACE over next 6 weeks - Early Invasive Strategies





Allergies:


Allergies:





Allergies








Coded Allergies Type Severity Reaction Last Updated Verified


 


  No Known Drug Allergies    14 No











Physical Exam:


PE:





Constitutional: Well developed, well nourished, no acute distress, non-toxic 

appearance. []


HENT: Normocephalic, atraumatic, bilateral external ears normal, oropharynx 

moist, no oral exudates, nose normal. []


Eyes: PERRLA, EOMI, conjunctiva normal, no discharge. [] 


Neck: Normal range of motion, no tenderness, supple, no stridor. [] 


Cardiovascular:Heart rate regular rhythm, no murmur []


Lungs & Thorax:  Bilateral breath sounds clear to auscultation []


Abdomen: Bowel sounds normal, soft, no tenderness, no masses, no pulsatile 

masses. [] 


Skin: Warm, dry, no erythema, no rash. [] 


Back: No tenderness, no CVA tenderness. [] 


Extremities: No tenderness, no cyanosis, no clubbing, ROM intact, no edema. [] 


Neurologic: Alert and oriented X 3, normal motor function, normal sensory 

function, no focal deficits noted. []


Psychologic: Affect normal, judgement normal, mood normal. []





EKG:


EK and read by Dr Lemons as Sinus rhythm no STEMI





Radiology/Procedures:


Radiology/Procedures:


[]


Impression:


                            [facility]


                    [address]  [place], [place] [id number] [phone]


                                        


                                 IMAGING REPORT





                                     Signed





PATIENT: SUDHIR BASHIR LACCOUNT: [id number]     MRN#: [id number]


: 1975           LOCATION: ER              AGE: 47


SEX: F                    EXAM DT: 22         ACCESSION#: [id number]


STATUS: PRE ER            ORD. PHYSICIAN: URSULA CABRALES


REASON: chest pain


PROCEDURE: PORTABLE CHEST 1V








INDICATION: Reason: chest pain / Spl. Instructions:  / History: 





COMPARISON: 2017





FINDINGS:





Single view of chest obtained.


No focal airspace consolidation.


Mild interstitial prominence but similar to prior.


Cardiac mediastinal silhouette is unremarkable.








IMPRESSION:





*  No focal airspace consolidation.





Electronically signed by: Radha Juan MD (2022 9:54 AM) [id number]














DICTATED and SIGNED BY:     RADHA JUAN MD


DATE:     22 [id number]





Course & Med Decision Making:


Course & Med Decision Making


Pertinent Labs and Imaging studies reviewed. (See chart for details)





See HPI.  Alert and oriented x4.  Ambulatory steady gait.  Speaks in full clear 

sentences.  Chest pain is not reproducible with movement or palpation.  Lungs 

are clear in upper lobes but diminished in lower lobes.  No extremity edema.  

Skin pink warm and dry.





Hypokalemia, hypomagnesia.  I have replace both by IV.  Not currently having 

chest pain.  We did talk about her heavy drinking she does not states that she 

wants help for that at this time.  Her liver enzymes, bili are also elevated.  

Given patient's heart score is a 4 and she has a close family history of heart 

disease patient will be admitted to the hospital for observation.  Patient 

admitted to hospitalist.





[]





Carmencita Disclaimer:


Dragon Disclaimer:


This electronic medical record was generated, in whole or in part, using a voice

 recognition dictation system.





Departure


Departure


Impression:  


   Primary Impression:  


   Hypomagnesemia


   Additional Impressions:  


   Hypokalemia


   Chest pain


   Qualified Codes:  R07.9 - Chest pain, unspecified


Disposition:   ADMITTED AS INPATIENT


Admitting Physician:  HIMS


Condition:  STABLE


Referrals:  


UNKNOWN PCP NAME (PCP)











URSULA CABRALES            2022 09:34

## 2022-01-31 NOTE — RAD
INDICATION: Reason: chest pain / Spl. Instructions:  / History: 



COMPARISON: August 12, 2017



FINDINGS:



Single view of chest obtained.

No focal airspace consolidation.

Mild interstitial prominence but similar to prior.

Cardiac mediastinal silhouette is unremarkable.





IMPRESSION:



*  No focal airspace consolidation.



Electronically signed by: Mc Harman MD (1/31/2022 9:54 AM) [id number]

## 2022-01-31 NOTE — PDOC1
History and Physical


Date of Admission


Date of Admission


DATE: 1/31/22 


TIME: 16:13





Source


Source:  Chart review, Patient





History of Present Illness


History of Present Illness


MS. Cummins is a 47  year old female, admit with chest pain, acute left-sided 

chest  ache.  Pain for 23 hours, off and on.


she had stopped drinknig for a day due to pain and nausea.   She usually drinks 

very heavily,  half bottle to a bottle of cognac daily.


 pain is now better, not short of breath.  


some family history fo CAD and  CHF, and aunt, cousin had CHF.


  She has not taken aspirin today.  Patient has a history of hypertension, GERD,

anxiety, smoking and drinks daily Cognac half a bottle.  Patient is not 

vaccinated for Covid.





Past Medical History


Cardiovascular:  HTN


GI:  GERD


Psych:  Anxiety, Addictions (ETOH)





Past Surgical History


Past Surgical History:  No pertinent history





Family History


Family History:  Diabetes, Heart Disease





Social History


Smoke:  <1 pack per day


ALCOHOL:  heavy (pint to fifth of Cognac daily )


Drugs:  None





Current Problem List


Problem List


Problems


Medical Problems:


(1) Chest pain


Status: Acute  





(2) Hypokalemia


Status: Acute  





(3) Hypomagnesemia


Status: Acute  











Current Medications


Current Medications





Current Medications


Aspirin (Godwin Aspirin) 325 mg 1X  ONCE PO  Last administered on 1/31/22at 

09:30;  Start 1/31/22 at 09:30;  Stop 1/31/22 at 09:31;  Status DC


Potassium Chloride (Klor-Con) 40 meq 1X  ONCE PO  Last administered on 1/31/22at

10:15;  Start 1/31/22 at 10:15;  Stop 1/31/22 at 10:17;  Status DC


Potassium Chloride/Water 100 ml @  50 mls/hr 1X  ONCE IV  Last administered on 

1/31/22at 10:46;  Start 1/31/22 at 10:15;  Stop 1/31/22 at 12:14;  Status DC


Magnesium Sulfate 50 ml @ 25 mls/hr 1X  ONCE IV  Last administered on 1/31/22at 

10:48;  Start 1/31/22 at 10:15;  Stop 1/31/22 at 12:14;  Status DC


Sodium Chloride 1,000 ml @  1,000 mls/hr 1X  ONCE IV  Last administered on 

1/31/22at 10:41;  Start 1/31/22 at 10:15;  Stop 1/31/22 at 11:14;  Status DC


Multivitamins 10 ml/Thiamine HCl 100 mg/Folic Acid 1 mg/Sodium Chloride 1,011.2 

ml  @ 1,000.088 mls/hr 1X  ONCE IV  Last administered on 1/31/22at 14:50;  Start

1/31/22 at 11:00;  Stop 1/31/22 at 12:00;  Status DC


Potassium Chloride (Klor-Con) 20 meq DAILYWBKFT PO ;  Start 2/1/22 at 08:00


Potassium Chloride (Klor-Con) 40 meq 1X  ONCE PO ;  Start 1/31/22 at 17:00;  

Stop 1/31/22 at 17:01


Vitamin B Complex (Folbic Tablet) 1 tab DAILY PO ;  Start 2/1/22 at 09:00


Famotidine (Pepcid) 20 mg 1X  ONCE PO ;  Start 1/31/22 at 15:45;  Stop 1/31/22 

at 15:46;  Status DC


Famotidine (Pepcid) 20 mg QHS PO ;  Start 1/31/22 at 21:00


Hydralazine HCl (Apresoline Inj) 10 mg PRN Q4HRS  PRN IVP ELEVATED BP, SEE 

COMMENTS;  Start 1/31/22 at 15:45





Active Scripts


Active


Ativan (Lorazepam) 1 Mg Tablet 1 Mg PO TID


Potassium Chloride ** (Potassium Chloride) 20 Meq Tablet.er 20 Meq PO DAILY 10 

Days


Prilosec Otc (Omeprazole Magnesium) 20 Mg Tablet.dr 1 Tab PO DAILY


Reported


Depo-Provera (Medroxyprogesterone Acetate) 400 Mg/1 Ml Vial 400 Mg IM 


Ranitidine Hcl 75 Mg Tablet 75 Mg PO 


Lisinopril 20 Mg Tablet 20 Mg PO





Allergies


Allergies:  


Coded Allergies:  


     No Known Drug Allergies (Unverified , 1/29/14)





ROS


Review of System


  Patient denies nausea, vomiting, abdominal pain, shortness of breath, syncope,

dizziness, fever, cough, urinary symptoms, back pain, numbness or tingling or 

focal weakness, vision changes.


General:  No: Chills, Night Sweats, Fatigue, Malaise, Appetite, Other


PSYCHOLOGICAL ROS:  No: Anxiety, Behavioral Disorder, Concentration difficultie,

Decreased libido, Depression, Disorientation, Hallucinations, Hostility, 

Irritablity, Memory difficulties, Mood Swings, Obsessive thoughts, Physical 

abuse, Sexual abuse, Sleep disturbances, Suicidal ideation, Other


Eyes:  No Blurry vision, No Decreased vision, No Double vision, No Dry eyes, No 

Excessive tearing, No Eye Pain, No Itchy Eyes, No Loss of vision, No 

Photophobia, No Scotomata, No Uses contacts, No Uses glasses, No Other


HEENT:  YES: Heacaches


Respiratory:  No: Cough, Hemoptysis, Orthopnea, Pleuritic Pain, Shortness of 

breath, SOB with excertion, Sputum Changes, Stridor, Tachypnea, Wheezing, Other


Cardiovascular:  No Chest Pain, No Palpitations, No Orthopnea, No Paroxysmal 

Noc. Dyspnea, No Edema, No Lt Headedness, No Other


Gastrointestinal:  Yes Nausea; 


   No Vomiting, No Abdominal Pain, No Diarrhea, No Constipation, No Melena, No 

Hematochezia, No Other


Genitourinary:  No Dysuria, No Frequency, No Incontinence, No Hematuria, No 

Retention, No Discharge, No Urgency, No Pain, No Flank Pain, No Other, No , No ,

No , No , No , No , No 


Musculoskeletal:  Yes Joint Stiffness; 


   No Gait Disturbance, No Joint Pain, No Joint Swelling, No Muscle Pain, No 

Muscular Weakness, No Pain In:, No Swelling In:, No Other


Neurological:  No Behavorial Changes, No Bowel/Bladder ControlChng, No 

Confusion, No Dizziness, No Gait Disturbance, No Headaches, No Impaired 

Coord/balance, No Memory Loss, No Numbness/Tingling, No Seizures, No Speech 

Problems, No Tremors, No Visual Changes, No Weakness, No Other


Skin:  Yes Dry Skin; 


   No Eczema, No Hair Changes, No Lumps, No Mole Changes, No Mottling, No Nail 

Changes, No Pruritus, No Rash, No Skin Lesion Changes, No Other, No Acne





Physical Exam


General:  Alert, Cooperative, mild distress


HEENT:  PERRLA


Heart:  S1S2, RRR, no murmurs


Abdomen:  Normal bowel sounds, Soft


Extremities:  No cyanosis, No edema


Skin:  No breakdown, No significant lesion


Neuro:  Normal speech, Sensation intact, Cranial nerves 3-12 NL


Psych/Mental Status:  Mood NL





Vitals


Vitals





Vital Signs








  Date Time  Temp Pulse Resp B/P (MAP) Pulse Ox O2 Delivery O2 Flow Rate FiO2


 


1/31/22 11:45  78 16 158/90 (112) 97 Room Air  


 


1/31/22 09:24 98.2       





 98.2       











Labs


Labs





Laboratory Tests








Test


 1/31/22


09:45 1/31/22


12:18 1/31/22


14:58


 


White Blood Count


 4.1 x10^3/uL


(4.0-11.0) 


 





 


Red Blood Count


 5.04 x10^6/uL


(3.50-5.40) 


 





 


Hemoglobin


 14.6 g/dL


(12.0-15.5) 


 





 


Hematocrit


 44.8 %


(36.0-47.0) 


 





 


Mean Corpuscular Volume 89 fL ()   


 


Mean Corpuscular Hemoglobin 29 pg (25-35)   


 


Mean Corpuscular Hemoglobin


Concent 33 g/dL


(31-37) 


 





 


Red Cell Distribution Width


 14.5 %


(11.5-14.5) 


 





 


Platelet Count


 291 x10^3/uL


(140-400) 


 





 


Neutrophils (%) (Auto) 39 % (31-73)   


 


Lymphocytes (%) (Auto) 41 % (24-48)   


 


Monocytes (%) (Auto) 16 % (0-9)   


 


Eosinophils (%) (Auto) 3 % (0-3)   


 


Basophils (%) (Auto) 1 % (0-3)   


 


Neutrophils # (Auto)


 1.6 x10^3/uL


(1.8-7.7) 


 





 


Lymphocytes # (Auto)


 1.7 x10^3/uL


(1.0-4.8) 


 





 


Monocytes # (Auto)


 0.7 x10^3/uL


(0.0-1.1) 


 





 


Eosinophils # (Auto)


 0.1 x10^3/uL


(0.0-0.7) 


 





 


Basophils # (Auto)


 0.0 x10^3/uL


(0.0-0.2) 


 





 


Sodium Level


 135 mmol/L


(136-145) 


 





 


Potassium Level


 2.5 mmol/L


(3.5-5.1) 


 





 


Chloride Level


 96 mmol/L


() 


 





 


Carbon Dioxide Level


 28 mmol/L


(21-32) 


 





 


Anion Gap 11 (6-14)   


 


Blood Urea Nitrogen 9 mg/dL (7-20)   


 


Creatinine


 0.7 mg/dL


(0.6-1.0) 


 





 


Estimated GFR


(Cockcroft-Gault) 108.1 


 


 





 


BUN/Creatinine Ratio 13 (6-20)   


 


Glucose Level


 99 mg/dL


(70-99) 


 





 


Calcium Level


 8.6 mg/dL


(8.5-10.1) 


 





 


Magnesium Level


 1.5 mg/dL


(1.8-2.4) 


 





 


Total Bilirubin


 1.4 mg/dL


(0.2-1.0) 


 





 


Aspartate Amino Transf


(AST/SGOT) 92 U/L (15-37) 


 


 





 


Alanine Aminotransferase


(ALT/SGPT) 106 U/L


(14-59) 


 





 


Alkaline Phosphatase


 85 U/L


() 


 





 


Troponin I High Sensitivity 7 ng/L (4-50)  7 ng/L (4-50)  


 


NT-Pro-B-Type Natriuretic


Peptide 49 pg/mL


(0-124) 


 





 


Total Protein


 8.8 g/dL


(6.4-8.2) 


 





 


Albumin


 3.5 g/dL


(3.4-5.0) 


 





 


Albumin/Globulin Ratio 0.7 (1.0-1.7)   


 


Lipase


 68 U/L


() 


 





 


Ethyl Alcohol Level


 < 10 mg/dL


(0-10) 


 





 


SARS-CoV-2 RNA (ELYSIA)


 Negative


(Negative) 


 





 


SARS-CoV-2 Antigen (Rapid)


 Negative


(NEGATIVE) 


 





 


Urine Color   Yellow 


 


Urine Clarity   Clear 


 


Urine pH   7.5 (<5.0-8.0) 


 


Urine Specific Gravity


 


 


 1.015


(1.000-1.030)


 


Urine Protein


 


 


 Negative mg/dL


(NEG-TRACE)


 


Urine Glucose (UA)


 


 


 Negative mg/dL


(NEG)


 


Urine Ketones (Stick)   15 mg/dL (NEG) 


 


Urine Blood   Negative (NEG) 


 


Urine Nitrite   Negative (NEG) 


 


Urine Bilirubin   Negative (NEG) 


 


Urine Urobilinogen Dipstick


 


 


 1.0 mg/dL (0.2


mg/dL)


 


Urine Leukocyte Esterase   Trace (NEG) 


 


Urine RBC   1-2 /HPF (0-2) 


 


Urine WBC


 


 


 5-10 /HPF


(0-4)


 


Urine Squamous Epithelial


Cells 


 


 Many /LPF 





 


Urine Bacteria


 


 


 Few /HPF


(0-FEW)


 


Urine Mucus   Slight /LPF 


 


Urine Opiates Screen   Neg (NEG) 


 


Urine Methadone Screen   Neg (NEG) 


 


Urine Barbiturates   Neg (NEG) 


 


Urine Phencyclidine Screen   Neg (NEG) 


 


Urine


Amphetamine/Methamphetamine 


 


 Neg (NEG) 





 


Urine Benzodiazepines Screen   Neg (NEG) 


 


Urine Cocaine Screen   Neg (NEG) 


 


Urine Cannabinoids Screen   Neg (NEG) 


 


Urine Ethyl Alcohol   Neg (NEG) 








Laboratory Tests








Test


 1/31/22


09:45 1/31/22


12:18 1/31/22


14:58


 


White Blood Count


 4.1 x10^3/uL


(4.0-11.0) 


 





 


Red Blood Count


 5.04 x10^6/uL


(3.50-5.40) 


 





 


Hemoglobin


 14.6 g/dL


(12.0-15.5) 


 





 


Hematocrit


 44.8 %


(36.0-47.0) 


 





 


Mean Corpuscular Volume 89 fL ()   


 


Mean Corpuscular Hemoglobin 29 pg (25-35)   


 


Mean Corpuscular Hemoglobin


Concent 33 g/dL


(31-37) 


 





 


Red Cell Distribution Width


 14.5 %


(11.5-14.5) 


 





 


Platelet Count


 291 x10^3/uL


(140-400) 


 





 


Neutrophils (%) (Auto) 39 % (31-73)   


 


Lymphocytes (%) (Auto) 41 % (24-48)   


 


Monocytes (%) (Auto) 16 % (0-9)   


 


Eosinophils (%) (Auto) 3 % (0-3)   


 


Basophils (%) (Auto) 1 % (0-3)   


 


Neutrophils # (Auto)


 1.6 x10^3/uL


(1.8-7.7) 


 





 


Lymphocytes # (Auto)


 1.7 x10^3/uL


(1.0-4.8) 


 





 


Monocytes # (Auto)


 0.7 x10^3/uL


(0.0-1.1) 


 





 


Eosinophils # (Auto)


 0.1 x10^3/uL


(0.0-0.7) 


 





 


Basophils # (Auto)


 0.0 x10^3/uL


(0.0-0.2) 


 





 


Sodium Level


 135 mmol/L


(136-145) 


 





 


Potassium Level


 2.5 mmol/L


(3.5-5.1) 


 





 


Chloride Level


 96 mmol/L


() 


 





 


Carbon Dioxide Level


 28 mmol/L


(21-32) 


 





 


Anion Gap 11 (6-14)   


 


Blood Urea Nitrogen 9 mg/dL (7-20)   


 


Creatinine


 0.7 mg/dL


(0.6-1.0) 


 





 


Estimated GFR


(Cockcroft-Gault) 108.1 


 


 





 


BUN/Creatinine Ratio 13 (6-20)   


 


Glucose Level


 99 mg/dL


(70-99) 


 





 


Calcium Level


 8.6 mg/dL


(8.5-10.1) 


 





 


Magnesium Level


 1.5 mg/dL


(1.8-2.4) 


 





 


Total Bilirubin


 1.4 mg/dL


(0.2-1.0) 


 





 


Aspartate Amino Transf


(AST/SGOT) 92 U/L (15-37) 


 


 





 


Alanine Aminotransferase


(ALT/SGPT) 106 U/L


(14-59) 


 





 


Alkaline Phosphatase


 85 U/L


() 


 





 


Troponin I High Sensitivity 7 ng/L (4-50)  7 ng/L (4-50)  


 


NT-Pro-B-Type Natriuretic


Peptide 49 pg/mL


(0-124) 


 





 


Total Protein


 8.8 g/dL


(6.4-8.2) 


 





 


Albumin


 3.5 g/dL


(3.4-5.0) 


 





 


Albumin/Globulin Ratio 0.7 (1.0-1.7)   


 


Lipase


 68 U/L


() 


 





 


Ethyl Alcohol Level


 < 10 mg/dL


(0-10) 


 





 


SARS-CoV-2 RNA (ELYSIA)


 Negative


(Negative) 


 





 


SARS-CoV-2 Antigen (Rapid)


 Negative


(NEGATIVE) 


 





 


Urine Color   Yellow 


 


Urine Clarity   Clear 


 


Urine pH   7.5 (<5.0-8.0) 


 


Urine Specific Gravity


 


 


 1.015


(1.000-1.030)


 


Urine Protein


 


 


 Negative mg/dL


(NEG-TRACE)


 


Urine Glucose (UA)


 


 


 Negative mg/dL


(NEG)


 


Urine Ketones (Stick)   15 mg/dL (NEG) 


 


Urine Blood   Negative (NEG) 


 


Urine Nitrite   Negative (NEG) 


 


Urine Bilirubin   Negative (NEG) 


 


Urine Urobilinogen Dipstick


 


 


 1.0 mg/dL (0.2


mg/dL)


 


Urine Leukocyte Esterase   Trace (NEG) 


 


Urine RBC   1-2 /HPF (0-2) 


 


Urine WBC


 


 


 5-10 /HPF


(0-4)


 


Urine Squamous Epithelial


Cells 


 


 Many /LPF 





 


Urine Bacteria


 


 


 Few /HPF


(0-FEW)


 


Urine Mucus   Slight /LPF 


 


Urine Opiates Screen   Neg (NEG) 


 


Urine Methadone Screen   Neg (NEG) 


 


Urine Barbiturates   Neg (NEG) 


 


Urine Phencyclidine Screen   Neg (NEG) 


 


Urine


Amphetamine/Methamphetamine 


 


 Neg (NEG) 





 


Urine Benzodiazepines Screen   Neg (NEG) 


 


Urine Cocaine Screen   Neg (NEG) 


 


Urine Cannabinoids Screen   Neg (NEG) 


 


Urine Ethyl Alcohol   Neg (NEG) 











VTE Prophylaxis Ordered


VTE Prophylaxis Devices:  No


VTE Pharmacological Prophylaxi:  Yes





Assessment/Plan


Assessment/Plan


chest pain,


angina,  r/o ACS


hypokalemia, 100 meq ordered


EtOH abuse, consult PAT team for resources, 


hypomag  2g iv,  repeat in AM


not vaccinated





Justifications for Admission


Other Justification














REMA BENJAMIN MD                 Jan 31, 2022 16:15

## 2022-01-31 NOTE — EKG
[facility]

              [address] [place] [address]

Test Date:    2022               Test Time:    09:22:02

Pat Name:     SUDHIR BASHIR          Department:   

Patient ID:   [id number]           Room:         [facility]

Gender:       F                        Technician:   

:          1975               Requested By: URSULA CABRALES

Order Number: [id number]           Reading MD:   Juan Aparicio

                                 Measurements

Intervals                              Axis          

Rate:         85                       P:            24

OR:           158                      QRS:          -30

QRSD:         72                       T:            159

QT:           294                                    

QTc:          350                                    

                           Interpretive Statements

SINUS RHYTHM

ABNORMAL LEFT AXIS DEVIATION

Electronically Signed On 2022 12:39:25 CST by Juan Aparicio

## 2022-01-31 NOTE — PDOC2
SOPHY RUBIO SHERIF 1/31/22 1236:


CARDIAC CONSULT


DATE OF CONSULT


Date of Consult


DATE: 1/31/22 


TIME: 12:33





REASON FOR CONSULT


Reason for Consult:


Chest pain





REFERRING PHYSICIAN


Referring Physician:


SHERIF Jimenez





SOURCE


Source:  Chart review, Patient





HISTORY OF PRESENT ILLNESS


HISTORY OF PRESENT ILLNESS


This is a 48 yo female who presented secondary to chest pain. Patient reports 

intermittent aching in her left chest since yesterday. Radiates to her left arm.

No associated dizziness, diaphoresis, shortness of breath, or nausea/vomiting. 

Just reports she hasn't "felt right" over the last couple of days. Werner any 

recent illness or fever. Does report cough. No recent cardiac workup. Had a 

stress test many years ago.





PAST MEDICAL HISTORY


Cardiovascular:  HTN


GI:  GERD


Psych:  Anxiety, Addictions (ETOH)





PAST SURGICAL HISTORY


Past Surgical History:  No pertinent history





FAMILY HISTORY


Family History:  Diabetes, Heart Disease





SOCIAL HISTORY


Smoke:  <1 pack per day


ALCOHOL:  heavy (pint to fifth of Cognac daily )


Drugs:  None


Lives:  with Family





CURRENT MEDICATIONS


CURRENT MEDICATIONS





Current Medications








 Medications


  (Trade)  Dose


 Ordered  Sig/Hilaria


 Route


 PRN Reason  Start Time


 Stop Time Status Last Admin


Dose Admin


 


 Aspirin


  (Godwin Aspirin)  325 mg  1X  ONCE


 PO


   1/31/22 09:30


 1/31/22 09:31 DC 1/31/22 09:30





 


 Potassium Chloride


  (Klor-Con)  40 meq  1X  ONCE


 PO


   1/31/22 10:15


 1/31/22 10:17 DC 1/31/22 10:15





 


 Potassium


 Chloride/Water  100 ml @ 


 50 mls/hr  1X  ONCE


 IV


   1/31/22 10:15


 1/31/22 12:14 DC 1/31/22 10:46





 


 Magnesium Sulfate  50 ml @ 25


 mls/hr  1X  ONCE


 IV


   1/31/22 10:15


 1/31/22 12:14 DC 1/31/22 10:48





 


 Sodium Chloride  1,000 ml @ 


 1,000 mls/hr  1X  ONCE


 IV


   1/31/22 10:15


 1/31/22 11:14 DC 1/31/22 10:41














ALLERGIES


ALLERGIES:  


Coded Allergies:  


     No Known Drug Allergies (Unverified , 1/29/14)





ROS


Review of System


14 point ROS conducted with pertinent positives noted above in HPI





PHYSICAL EXAM


General:  Alert, Oriented X3, Cooperative, No acute distress


HEENT:  Atraumatic


Lungs:  Clear to auscultation


Heart:  Regular rate


Abdomen:  Soft, No tenderness


Extremities:  No edema, Normal pulses


Skin:  No significant lesion


Neuro:  Normal speech, Sensation intact


Psych/Mental Status:  Mental status NL, Mood NL


MUSCULOSKELETAL:  Osteoarthritic changes both hands





VITALS/I&O


VITALS/I&O:





                                   Vital Signs








  Date Time  Temp Pulse Resp B/P (MAP) Pulse Ox O2 Delivery O2 Flow Rate FiO2


 


1/31/22 11:45  78 16 158/90 (112) 97 Room Air  


 


1/31/22 09:24 98.2       





 98.2       











LABS


Lab:





                                Laboratory Tests








Test


 1/31/22


09:45


 


White Blood Count


 4.1 x10^3/uL


(4.0-11.0)


 


Red Blood Count


 5.04 x10^6/uL


(3.50-5.40)


 


Hemoglobin


 14.6 g/dL


(12.0-15.5)


 


Hematocrit


 44.8 %


(36.0-47.0)


 


Mean Corpuscular Volume


 89 fL ()





 


Mean Corpuscular Hemoglobin 29 pg (25-35)  


 


Mean Corpuscular Hemoglobin


Concent 33 g/dL


(31-37)


 


Red Cell Distribution Width


 14.5 %


(11.5-14.5)


 


Platelet Count


 291 x10^3/uL


(140-400)


 


Neutrophils (%) (Auto) 39 % (31-73)  


 


Lymphocytes (%) (Auto) 41 % (24-48)  


 


Monocytes (%) (Auto) 16 % (0-9)  H


 


Eosinophils (%) (Auto) 3 % (0-3)  


 


Basophils (%) (Auto) 1 % (0-3)  


 


Neutrophils # (Auto)


 1.6 x10^3/uL


(1.8-7.7)  L


 


Lymphocytes # (Auto)


 1.7 x10^3/uL


(1.0-4.8)


 


Monocytes # (Auto)


 0.7 x10^3/uL


(0.0-1.1)


 


Eosinophils # (Auto)


 0.1 x10^3/uL


(0.0-0.7)


 


Basophils # (Auto)


 0.0 x10^3/uL


(0.0-0.2)


 


Sodium Level


 135 mmol/L


(136-145)  L


 


Potassium Level


 2.5 mmol/L


(3.5-5.1)  *L


 


Chloride Level


 96 mmol/L


()  L


 


Carbon Dioxide Level


 28 mmol/L


(21-32)


 


Anion Gap 11 (6-14)  


 


Blood Urea Nitrogen


 9 mg/dL (7-20)





 


Creatinine


 0.7 mg/dL


(0.6-1.0)


 


Estimated GFR


(Cockcroft-Gault) 108.1  





 


BUN/Creatinine Ratio 13 (6-20)  


 


Glucose Level


 99 mg/dL


(70-99)


 


Calcium Level


 8.6 mg/dL


(8.5-10.1)


 


Magnesium Level


 1.5 mg/dL


(1.8-2.4)  L


 


Total Bilirubin


 1.4 mg/dL


(0.2-1.0)  H


 


Aspartate Amino Transferase


(AST) 92 U/L (15-37)


H


 


Alanine Aminotransferase (ALT)


 106 U/L


(14-59)  H


 


Alkaline Phosphatase


 85 U/L


()


 


Troponin I High Sensitivity 7 ng/L (4-50)  


 


NT-Pro-B-Type Natriuretic


Peptide 49 pg/mL


(0-124)


 


Total Protein


 8.8 g/dL


(6.4-8.2)  H


 


Albumin


 3.5 g/dL


(3.4-5.0)


 


Albumin/Globulin Ratio


 0.7 (1.0-1.7)


L


 


Lipase


 68 U/L


()  L


 


Ethyl Alcohol Level


 < 10 mg/dL


(0-10)


 


SARS-CoV-2 Antigen (Rapid)


 Negative


(NEGATIVE)





                                Laboratory Tests


1/31/22 09:45








                                Laboratory Tests


1/31/22 09:45











ASSESSMENT/PLAN


ASSESSMENT/PLAN


1.  Chest pain, atypical. Initial trop negative 


2.  Hypertension; elevated upon arrival


3.  Severe hypokalemia, hypomagnesemia; being replaced 


4.  Elevated LFTs


5.  ETOH abuse; pint to fifth of Cognac daily. Withdrawal management of per IM








Recommendations





Trend troponin


Lipids


Resume home antiHTN therapy; titrate as warranted


Hydralazine IV PRN


Echo to assess LV systolic function


Probable outpatient ischemic evaluation





HEATHER YEH MD 2/1/22 7216:


CARDIAC CONSULT


ASSESSMENT/PLAN


ASSESSMENT/PLAN


Patient seen and examined.  Agree with NP's assessment and plan.


CP with atypical features.  EKG without acute changes and initial trop negative


Resume home antihypertensives and titrate if needed for better BP control


C heck 2D echo to assess LVF and rule out WMA


Plan ischemic evaluation as outpatient


Monitor for alcohol withdrawal


Thank you for your consultation











SOPHY RUBIO           Jan 31, 2022 12:36


HEATHER YEH MD            Feb 1, 2022 05:39

## 2022-02-01 VITALS — SYSTOLIC BLOOD PRESSURE: 146 MMHG | DIASTOLIC BLOOD PRESSURE: 88 MMHG

## 2022-02-01 VITALS — DIASTOLIC BLOOD PRESSURE: 89 MMHG | SYSTOLIC BLOOD PRESSURE: 119 MMHG

## 2022-02-01 VITALS — DIASTOLIC BLOOD PRESSURE: 90 MMHG | SYSTOLIC BLOOD PRESSURE: 145 MMHG

## 2022-02-01 LAB
ANION GAP SERPL CALC-SCNC: 9 MMOL/L (ref 6–14)
BUN SERPL-MCNC: 8 MG/DL (ref 7–20)
CALCIUM SERPL-MCNC: 8 MG/DL (ref 8.5–10.1)
CHLORIDE SERPL-SCNC: 105 MMOL/L (ref 98–107)
CHOLEST SERPL-MCNC: 172 MG/DL (ref 0–200)
CHOLEST/HDLC SERPL: 3 {RATIO}
CO2 SERPL-SCNC: 26 MMOL/L (ref 21–32)
CREAT SERPL-MCNC: 0.7 MG/DL (ref 0.6–1)
GFR SERPLBLD BASED ON 1.73 SQ M-ARVRAT: 108.1 ML/MIN
GLUCOSE SERPL-MCNC: 97 MG/DL (ref 70–99)
HDLC SERPL-MCNC: 57 MG/DL (ref 40–60)
LDLC: 96 MG/DL (ref 0–100)
MAGNESIUM SERPL-MCNC: 2 MG/DL (ref 1.8–2.4)
POTASSIUM SERPL-SCNC: 3.6 MMOL/L (ref 3.5–5.1)
SODIUM SERPL-SCNC: 140 MMOL/L (ref 136–145)
TRIGL SERPL-MCNC: 94 MG/DL (ref 0–150)
VLDLC: 19 MG/DL (ref 0–40)

## 2022-02-01 NOTE — CARD
MR#: [id number]

Account#: [id number]

Accession#: [id number]

Date of Study: 02/01/2022

Ordering Physician: SOPHY RUBIO, 

Referring Physician: SOPHY RUBIO, 

Tech: [facility]





--------------- APPROVED REPORT --------------





EXAM: Two-dimensional and M-mode echocardiogram with Doppler and color Doppler.



Other Information 

Quality : GoodHR: 75bpm

Rhythm : NSR



INDICATION

Chest Pain 



2D DIMENSIONS 

RVDd3.1 (2.9-3.5cm)Left Atrium(2D)3.0 (1.6-4.0cm)

IVSd1.3 (0.7-1.1cm)Aortic Root(2D)2.9 (2.0-3.7cm)

LVDd3.3 (3.9-5.9cm)LVOT Diameter1.8 (1.8-2.4cm)

PWd1.1 (0.7-1.1cm)LVDs2.5 (2.5-4.0cm)

FS (%) 25.2 %SV22.4 ml

LVEF(%)51.0 (>50%)



Aortic Valve

AoV Peak Marty.127.7cm/sAoV VTI27.3cm

AO Peak GR.6.5mmHgLVOT Peak Marty.112.6cm/s

AO Mean GR.3mmHgAVA (VMAX)2.31cm2



Mitral Valve

MV E Yrsktvfe68.8cm/sMV DECEL [id number]

MV A Wolbqjfd67.3cm/sE/A  Ratio0.9



Tricuspid Valve

TR P. Nglevtbd675sf/sTR Peak Gr.18mmHg



 LEFT VENTRICLE 

The left ventricle is normal size. There is mild concentric left ventricular hypertrophy. The left ve
ntricular systolic function is normal.  Estimated ejection fraction 60%. There is normal LV segmental
 wall motion. The left ventricular diastolic function and filling is normal for age.



 RIGHT VENTRICLE 

The right ventricle is normal size. There is normal right ventricular wall thickness. The right ventr
icular systolic function is normal.



 ATRIA 

The left atrium size is normal. The right atrium size is normal. The interatrial septum is intact wit
h no evidence for an atrial septal defect or patent foramen ovale as noted on 2-D or Doppler imaging.




 AORTIC VALVE 

The aortic valve is normal in structure and function. Doppler and Color Flow revealed no significant 
aortic regurgitation. There is no significant aortic valvular stenosis.



 MITRAL VALVE 

The mitral valve is normal in structure and function. There is no evidence of mitral valve prolapse. 
There is no mitral valve stenosis. Doppler and Color Flow revealed no mitral valve regurgitation note
d.



 TRICUSPID VALVE 

The tricuspid valve is normal in structure and function. Doppler and Color Flow revealed trace to mil
d tricuspid regurgitation. Estimated PAP 22 mmHg. There is no tricuspid valve stenosis.



 PULMONIC VALVE 

The pulmonary valve is normal in structure and function. Doppler and Color Flow revealed no pulmonic 
valvular regurgitation.



 GREAT VESSELS 

The aortic root is normal in size. The ascending aorta is normal in size. The IVC is normal in size a
nd collapses >50% with inspiration.



 PERICARDIAL EFFUSION 

There is no evidence of significant pericardial effusion.



Critical Notification

Critical Value: No



<Conclusion>

The left ventricular systolic function is normal.  

Estimated ejection fraction 60%.

There is normal LV segmental wall motion.

Trace to mild tricuspid regurgitation.  Estimated PAP 22 mmHg.

There is no evidence of significant pericardial effusion.



Signed by : Herman Gaffney, 

Electronically Approved : 02/01/2022 11:36:34

## 2022-02-01 NOTE — NUR
SS following for discharge planning. SS reviewed pt chart and discussed with pt RN. Pt is 
from home and is currently on room air. COVID19 negative. Discharge order on the chart for 
home with self care.

## 2022-02-01 NOTE — PDOC3
Discharge Summary


Visit Information


Date of Admission:  Jan 31, 2022


Date of Discharge:  Feb 1, 2022


Admitting Diagnosis:  Chest pain


Final Diagnosis


Problems


Medical Problems:


(1) Chest pain


Status: Acute  





(2) Hypokalemia


Status: Acute  





(3) Hypomagnesemia


Status: Acute  











Brief Hospital Course


Allergies





                                    Allergies








Coded Allergies Type Severity Reaction Last Updated Verified


 


  No Known Drug Allergies    1/29/14 No








Vital Signs





Vital Signs








  Date Time  Temp Pulse Resp B/P (MAP) Pulse Ox O2 Delivery O2 Flow Rate FiO2


 


2/1/22 10:03 98.2 72 18 119/89 (99) 99 Room Air  





 98.2       








Lab Results





Laboratory Tests








Test


 1/31/22


09:45 1/31/22


12:18 1/31/22


14:58 1/31/22


16:35


 


White Blood Count


 4.1 x10^3/uL


(4.0-11.0) 


 


 





 


Red Blood Count


 5.04 x10^6/uL


(3.50-5.40) 


 


 





 


Hemoglobin


 14.6 g/dL


(12.0-15.5) 


 


 





 


Hematocrit


 44.8 %


(36.0-47.0) 


 


 





 


Mean Corpuscular Volume 89 fL ()    


 


Mean Corpuscular Hemoglobin 29 pg (25-35)    


 


Mean Corpuscular Hemoglobin


Concent 33 g/dL


(31-37) 


 


 





 


Red Cell Distribution Width


 14.5 %


(11.5-14.5) 


 


 





 


Platelet Count


 291 x10^3/uL


(140-400) 


 


 





 


Neutrophils (%) (Auto) 39 % (31-73)    


 


Lymphocytes (%) (Auto) 41 % (24-48)    


 


Monocytes (%) (Auto) 16 % (0-9)    


 


Eosinophils (%) (Auto) 3 % (0-3)    


 


Basophils (%) (Auto) 1 % (0-3)    


 


Neutrophils # (Auto)


 1.6 x10^3/uL


(1.8-7.7) 


 


 





 


Lymphocytes # (Auto)


 1.7 x10^3/uL


(1.0-4.8) 


 


 





 


Monocytes # (Auto)


 0.7 x10^3/uL


(0.0-1.1) 


 


 





 


Eosinophils # (Auto)


 0.1 x10^3/uL


(0.0-0.7) 


 


 





 


Basophils # (Auto)


 0.0 x10^3/uL


(0.0-0.2) 


 


 





 


Sodium Level


 135 mmol/L


(136-145) 


 


 





 


Potassium Level


 2.5 mmol/L


(3.5-5.1) 


 


 





 


Chloride Level


 96 mmol/L


() 


 


 





 


Carbon Dioxide Level


 28 mmol/L


(21-32) 


 


 





 


Anion Gap 11 (6-14)    


 


Blood Urea Nitrogen 9 mg/dL (7-20)    


 


Creatinine


 0.7 mg/dL


(0.6-1.0) 


 


 





 


Estimated GFR


(Cockcroft-Gault) 108.1 


 


 


 





 


BUN/Creatinine Ratio 13 (6-20)    


 


Glucose Level


 99 mg/dL


(70-99) 


 


 





 


Calcium Level


 8.6 mg/dL


(8.5-10.1) 


 


 





 


Magnesium Level


 1.5 mg/dL


(1.8-2.4) 


 


 





 


Total Bilirubin


 1.4 mg/dL


(0.2-1.0) 


 


 





 


Aspartate Amino Transf


(AST/SGOT) 92 U/L (15-37) 


 


 


 





 


Alanine Aminotransferase


(ALT/SGPT) 106 U/L


(14-59) 


 


 





 


Alkaline Phosphatase


 85 U/L


() 


 


 





 


Troponin I High Sensitivity 7 ng/L (4-50)  7 ng/L (4-50)   7 ng/L (4-50) 


 


NT-Pro-B-Type Natriuretic


Peptide 49 pg/mL


(0-124) 


 


 





 


Total Protein


 8.8 g/dL


(6.4-8.2) 


 


 





 


Albumin


 3.5 g/dL


(3.4-5.0) 


 


 





 


Albumin/Globulin Ratio 0.7 (1.0-1.7)    


 


Lipase


 68 U/L


() 


 


 





 


Ethyl Alcohol Level


 < 10 mg/dL


(0-10) 


 


 





 


SARS-CoV-2 RNA (ELYSIA)


 Negative


(Negative) 


 


 





 


SARS-CoV-2 Antigen (Rapid)


 Negative


(NEGATIVE) 


 


 





 


Urine Color   Yellow  


 


Urine Clarity   Clear  


 


Urine pH   7.5 (<5.0-8.0)  


 


Urine Specific Gravity


 


 


 1.015


(1.000-1.030) 





 


Urine Protein


 


 


 Negative mg/dL


(NEG-TRACE) 





 


Urine Glucose (UA)


 


 


 Negative mg/dL


(NEG) 





 


Urine Ketones (Stick)   15 mg/dL (NEG)  


 


Urine Blood   Negative (NEG)  


 


Urine Nitrite   Negative (NEG)  


 


Urine Bilirubin   Negative (NEG)  


 


Urine Urobilinogen Dipstick


 


 


 1.0 mg/dL (0.2


mg/dL) 





 


Urine Leukocyte Esterase   Trace (NEG)  


 


Urine RBC   1-2 /HPF (0-2)  


 


Urine WBC


 


 


 5-10 /HPF


(0-4) 





 


Urine Squamous Epithelial


Cells 


 


 Many /LPF 


 





 


Urine Bacteria


 


 


 Few /HPF


(0-FEW) 





 


Urine Mucus   Slight /LPF  


 


Urine Opiates Screen   Neg (NEG)  


 


Urine Methadone Screen   Neg (NEG)  


 


Urine Barbiturates   Neg (NEG)  


 


Urine Phencyclidine Screen   Neg (NEG)  


 


Urine


Amphetamine/Methamphetamine 


 


 Neg (NEG) 


 





 


Urine Benzodiazepines Screen   Neg (NEG)  


 


Urine Cocaine Screen   Neg (NEG)  


 


Urine Cannabinoids Screen   Neg (NEG)  


 


Urine Ethyl Alcohol   Neg (NEG)  


 


Thyroid Stimulating Hormone


(TSH) 


 


 


 0.293 uIU/mL


(0.358-3.74)


 


Test


 2/1/22


04:50 


 


 





 


Sodium Level


 140 mmol/L


(136-145) 


 


 





 


Potassium Level


 3.6 mmol/L


(3.5-5.1) 


 


 





 


Chloride Level


 105 mmol/L


() 


 


 





 


Carbon Dioxide Level


 26 mmol/L


(21-32) 


 


 





 


Anion Gap 9 (6-14)    


 


Blood Urea Nitrogen 8 mg/dL (7-20)    


 


Creatinine


 0.7 mg/dL


(0.6-1.0) 


 


 





 


Estimated GFR


(Cockcroft-Gault) 108.1 


 


 


 





 


Glucose Level


 97 mg/dL


(70-99) 


 


 





 


Calcium Level


 8.0 mg/dL


(8.5-10.1) 


 


 





 


Magnesium Level


 2.0 mg/dL


(1.8-2.4) 


 


 





 


Triglycerides Level


 94 mg/dL


(0-150) 


 


 





 


Cholesterol Level


 172 mg/dL


(0-200) 


 


 





 


LDL Cholesterol, Calculated


 96 mg/dL


(0-100) 


 


 





 


VLDL Cholesterol, Calculated


 19 mg/dL


(0-40) 


 


 





 


Non-HDL Cholesterol Calculated


 115 mg/dL


(0-129) 


 


 





 


HDL Cholesterol


 57 mg/dL


(40-60) 


 


 





 


Cholesterol/HDL Ratio 3.0    








Laboratory Tests








Test


 1/31/22


12:18 1/31/22


14:58 1/31/22


16:35 2/1/22


04:50


 


Troponin I High Sensitivity 7 ng/L (4-50)   7 ng/L (4-50)  


 


Urine Color  Yellow   


 


Urine Clarity  Clear   


 


Urine pH  7.5 (<5.0-8.0)   


 


Urine Specific Gravity


 


 1.015


(1.000-1.030) 


 





 


Urine Protein


 


 Negative mg/dL


(NEG-TRACE) 


 





 


Urine Glucose (UA)


 


 Negative mg/dL


(NEG) 


 





 


Urine Ketones (Stick)  15 mg/dL (NEG)   


 


Urine Blood  Negative (NEG)   


 


Urine Nitrite  Negative (NEG)   


 


Urine Bilirubin  Negative (NEG)   


 


Urine Urobilinogen Dipstick


 


 1.0 mg/dL (0.2


mg/dL) 


 





 


Urine Leukocyte Esterase  Trace (NEG)   


 


Urine RBC  1-2 /HPF (0-2)   


 


Urine WBC


 


 5-10 /HPF


(0-4) 


 





 


Urine Squamous Epithelial


Cells 


 Many /LPF 


 


 





 


Urine Bacteria


 


 Few /HPF


(0-FEW) 


 





 


Urine Mucus  Slight /LPF   


 


Urine Opiates Screen  Neg (NEG)   


 


Urine Methadone Screen  Neg (NEG)   


 


Urine Barbiturates  Neg (NEG)   


 


Urine Phencyclidine Screen  Neg (NEG)   


 


Urine


Amphetamine/Methamphetamine 


 Neg (NEG) 


 


 





 


Urine Benzodiazepines Screen  Neg (NEG)   


 


Urine Cocaine Screen  Neg (NEG)   


 


Urine Cannabinoids Screen  Neg (NEG)   


 


Urine Ethyl Alcohol  Neg (NEG)   


 


Thyroid Stimulating Hormone


(TSH) 


 


 0.293 uIU/mL


(0.358-3.74) 





 


Sodium Level


 


 


 


 140 mmol/L


(136-145)


 


Potassium Level


 


 


 


 3.6 mmol/L


(3.5-5.1)


 


Chloride Level


 


 


 


 105 mmol/L


()


 


Carbon Dioxide Level


 


 


 


 26 mmol/L


(21-32)


 


Anion Gap    9 (6-14) 


 


Blood Urea Nitrogen    8 mg/dL (7-20) 


 


Creatinine


 


 


 


 0.7 mg/dL


(0.6-1.0)


 


Estimated GFR


(Cockcroft-Gault) 


 


 


 108.1 





 


Glucose Level


 


 


 


 97 mg/dL


(70-99)


 


Calcium Level


 


 


 


 8.0 mg/dL


(8.5-10.1)


 


Magnesium Level


 


 


 


 2.0 mg/dL


(1.8-2.4)


 


Triglycerides Level


 


 


 


 94 mg/dL


(0-150)


 


Cholesterol Level


 


 


 


 172 mg/dL


(0-200)


 


LDL Cholesterol, Calculated


 


 


 


 96 mg/dL


(0-100)


 


VLDL Cholesterol, Calculated


 


 


 


 19 mg/dL


(0-40)


 


Non-HDL Cholesterol Calculated


 


 


 


 115 mg/dL


(0-129)


 


HDL Cholesterol


 


 


 


 57 mg/dL


(40-60)


 


Cholesterol/HDL Ratio    3.0 








Brief Hospital Course


MS. Cummins is a 47  year old female, admit with chest pain, acute left-sided 

chest  ache.  Pain for 23 hours, off and on.


she had stopped drinking for a day due to pain and nausea.   She usually drinks 

very heavily,  half bottle to a bottle of cognac daily.


 pain is now better, not short of breath.  


some family history fo CAD and  CHF, and aunt, cousin had CHF.


  She has not taken aspirin today.  Patient has a history of hypertension, GERD,

anxiety, smoking and drinks daily Cognac half a bottle.  Patient is not 

vaccinated for Covid.


Potassium and magnesium life-threatening low replaced





2/1: Chest pain resolved.  Long discussion about alcohol cessation undertaken.  

She goes weeks at a time without drinking and has been given outpatient 

resources.  Also given resources to schedule outpatient stress testing.  She 

follows at the [facility] amlodipine.  She complained of 

insomnia when she does not drink, I have offered her trazodone which she is 

amenable to taking.





Underwent echo for risk stratification, 


The left ventricular systolic function is normal.  


Estimated ejection fraction 60%.


There is normal LV segmental wall motion.


Trace to mild tricuspid regurgitation.  Estimated PAP 22 mmHg.


There is no evidence of significant pericardial effusion.





Consults: Cardiology





Problem list:


chest pain,


angina,  r/o ACS


hypokalemia, 100 meq ordered, replaced


EtOH abuse, consult PAT team for resources outpatient


hypomag  replaced








Greater than 30 minutes spent on d/c home with self care.





Discharge Information


Condition at Discharge:  Improved


Follow Up:  Weeks (2)


Disposition/Orders:  D/C to Home


Scheduled


Amlodipine Besylate (Amlodipine Besylate) 5 Mg Tablet, 5 MG PO QHS for HTN for 

30 Days, #30 Ref 11


   Prescribed by: ODIN CANTRELL MD on 2/1/22 1141


Potassium Chloride (Potassium Chloride **) 20 Meq Tablet.er, 20 MEQ PO DAILY for

SUPPLEMENT for 10 Days, #10


   Prescribed by: PJ MAE D.O. on 7/12/20 2120


Trazodone Hcl (Trazodone Hcl) 100 Mg Tablet, 1 TAB PO QHS for Sleep/ETOH for 30 

Days, #30 Ref 2


   Prescribed by: ODIN CANTRELL MD on 2/1/22 1141





Miscellaneous Medications


Lisinopril (Lisinopril) 20 Mg Tablet, 20 MG PO, (Reported)


   Entered as Reported by: TRISTAN BROWN on 1/29/14 2205


Medroxyprogesterone Acetate (Depo-Provera) 400 Mg/1 Ml Vial, 400 MG IM, 

(Reported)


   Entered as Reported by: TRISTAN BROWN on 1/29/14 2205


Ranitidine Hcl (Ranitidine Hcl) 75 Mg Tablet, 75 MG PO, (Reported)


   Entered as Reported by: TRISTAN BROWN on 1/29/14 2205





Discontinued Medications


Lorazepam (Ativan) 1 Mg Tablet, 1 MG PO TID, #14


   Prescribed by: PJ MAE D.O. on 7/12/20 2120


Omeprazole Magnesium (Prilosec Otc) 20 Mg Tablet.dr, 1 TAB PO DAILY, #30 Ref 3


   Prescribed by: JAYLEN QUINTANA MD on 8/13/17 0141





Justicifation of Admission Dx:


Justifications for Admission:


Justification of Admission Dx:  N/A











ODIN CANTRELL MD         Feb 1, 2022 12:02

## 2022-02-01 NOTE — PDOC
SOPHY RUBIO APRN 2/1/22 1003:


CARDIO Progress Notes


Date and Time


Date of Service


2/1/22


Time of Evaluation


1000





Subjective


Subjective:  No Chest Pain, No shortness of breath, No Palpitations





Vitals


Vitals





Vital Signs








  Date Time  Temp Pulse Resp B/P (MAP) Pulse Ox O2 Delivery O2 Flow Rate FiO2


 


2/1/22 08:00      Room Air  


 


2/1/22 07:43 97.9 79 18 146/88 (107) 99   





 97.9       








Weight


Weight [ ]





Input and Output


Intake and Output











Intake and Output 


 


 2/1/22





 07:00


 


Intake Total 520 ml


 


Balance 520 ml


 


 


 


Intake Oral 520 ml


 


# Voids 4











Laboratory


Labs





Laboratory Tests








Test


 1/31/22


12:18 1/31/22


14:58 1/31/22


16:35 2/1/22


04:50


 


Troponin I High Sensitivity 7 ng/L (4-50)   7 ng/L (4-50)  


 


Urine Color  Yellow   


 


Urine Clarity  Clear   


 


Urine pH  7.5 (<5.0-8.0)   


 


Urine Specific Gravity


 


 1.015


(1.000-1.030) 


 





 


Urine Protein


 


 Negative mg/dL


(NEG-TRACE) 


 





 


Urine Glucose (UA)


 


 Negative mg/dL


(NEG) 


 





 


Urine Ketones (Stick)  15 mg/dL (NEG)   


 


Urine Blood  Negative (NEG)   


 


Urine Nitrite  Negative (NEG)   


 


Urine Bilirubin  Negative (NEG)   


 


Urine Urobilinogen Dipstick


 


 1.0 mg/dL (0.2


mg/dL) 


 





 


Urine Leukocyte Esterase  Trace (NEG)   


 


Urine RBC  1-2 /HPF (0-2)   


 


Urine WBC


 


 5-10 /HPF


(0-4) 


 





 


Urine Squamous Epithelial


Cells 


 Many /LPF 


 


 





 


Urine Bacteria


 


 Few /HPF


(0-FEW) 


 





 


Urine Mucus  Slight /LPF   


 


Urine Opiates Screen  Neg (NEG)   


 


Urine Methadone Screen  Neg (NEG)   


 


Urine Barbiturates  Neg (NEG)   


 


Urine Phencyclidine Screen  Neg (NEG)   


 


Urine


Amphetamine/Methamphetamine 


 Neg (NEG) 


 


 





 


Urine Benzodiazepines Screen  Neg (NEG)   


 


Urine Cocaine Screen  Neg (NEG)   


 


Urine Cannabinoids Screen  Neg (NEG)   


 


Urine Ethyl Alcohol  Neg (NEG)   


 


Thyroid Stimulating Hormone


(TSH) 


 


 0.293 uIU/mL


(0.358-3.74) 





 


Sodium Level


 


 


 


 140 mmol/L


(136-145)


 


Potassium Level


 


 


 


 3.6 mmol/L


(3.5-5.1)


 


Chloride Level


 


 


 


 105 mmol/L


()


 


Carbon Dioxide Level


 


 


 


 26 mmol/L


(21-32)


 


Anion Gap    9 (6-14) 


 


Blood Urea Nitrogen    8 mg/dL (7-20) 


 


Creatinine


 


 


 


 0.7 mg/dL


(0.6-1.0)


 


Estimated GFR


(Cockcroft-Gault) 


 


 


 108.1 





 


Glucose Level


 


 


 


 97 mg/dL


(70-99)


 


Calcium Level


 


 


 


 8.0 mg/dL


(8.5-10.1)


 


Magnesium Level


 


 


 


 2.0 mg/dL


(1.8-2.4)


 


Triglycerides Level


 


 


 


 94 mg/dL


(0-150)


 


Cholesterol Level


 


 


 


 172 mg/dL


(0-200)


 


LDL Cholesterol, Calculated


 


 


 


 96 mg/dL


(0-100)


 


VLDL Cholesterol, Calculated


 


 


 


 19 mg/dL


(0-40)


 


Non-HDL Cholesterol Calculated


 


 


 


 115 mg/dL


(0-129)


 


HDL Cholesterol


 


 


 


 57 mg/dL


(40-60)


 


Cholesterol/HDL Ratio    3.0 











Physical Exam


HEENT:  Neck Supple W Full Motion


Chest:  Symmetric


LUNGS:  Clear to Auscultation


Heart:  S1S2, RRR, no murmurs


Abdomen:  Soft N/T


Extremities:  No Edema


Neurology:  alert, oriented, follow commands





Assessment


Assessment


1.  Chest pain, atypical. AMI ruled out. Echo with preserved LV systolic 

function, no WMA


2.  Hypertension; better controlled


3.  Severe hypokalemia, hypomagnesemia; replaced 


4.  Elevated LFTs


5.  ETOH abuse; discussed/encouraged cessation 








Recommendations





Consider outpatient ischemic evaluation


Patient to follow up with [facility] upon discharge





Justicifation of Admission Dx:


Justifications for Admission:


Justification of Admission Dx:  N/A





HEATHER YEH MD 2/1/22 1601:


CARDIO Progress Notes


Assessment


Assessment


Patient seen and examined.  Agree with NP's assessment and plan.


CP with atypical features. Myocardial infarction ruled out


2D echo showed normal LV systolic function without any wall motion abnormalities


Blood pressure better controlled


Potassium and magnesium replaced


Consider ischemic evaluation as an outpatient











SOPHY RUBIO            Feb 1, 2022 10:03


HEATHER YEH MD            Feb 1, 2022 16:01

## 2022-02-01 NOTE — PDOC
[facility] PROGRESS NOTE


Date of Service


DOS:


DATE: 2/1/22 


TIME: 11:42





Chief Complaint


Chief Complaint


chest pain,


angina,  r/o ACS


hypokalemia, 100 meq ordered, replaced


EtOH abuse, consult PAT team for resources outpatient


hypomag  replaced


not vaccinated





History of Present Illness


History of Present Illness


MS. Cummins is a 47  year old female, admit with chest pain, acute left-sided 

chest  ache.  Pain for 23 hours, off and on.


she had stopped drinknig for a day due to pain and nausea.   She usually drinks 

very heavily,  half bottle to a bottle of cognac daily.


 pain is now better, not short of breath.  


some family history fo CAD and  CHF, and aunt, cousin had CHF.


  She has not taken aspirin today.  Patient has a history of hypertension, GERD,

anxiety, smoking and drinks daily Cognac half a bottle.  Patient is not 

vaccinated for Covid.


Potassium and magnesium life-threatening low replaced





2/1: Chest pain resolved.  Long discussion about alcohol cessation undertaken.  

She goes weeks at a time without drinking and has been given outpatient 

resources.  Also given resources to schedule outpatient stress testing.  She 

follows at the [facility] amlodipine.  She complained of 

insomnia when she does not drink, I have offered her trazodone which she is 

amenable to taking.





Vitals/I&O


Vitals/I&O:





                                   Vital Signs








  Date Time  Temp Pulse Resp B/P (MAP) Pulse Ox O2 Delivery O2 Flow Rate FiO2


 


2/1/22 10:03 98.2 72 18 119/89 (99) 99 Room Air  





 98.2       














                                    I & O   


 


 1/31/22 1/31/22 2/1/22





 15:00 23:00 07:00


 


Intake Total   520 ml


 


Balance   520 ml











Physical Exam


General:  Alert, Cooperative, mild distress


Heart:  Regular rate


Abdomen:  Normal bowel sounds, Soft


Extremities:  No cyanosis, No edema


Skin:  No breakdown, No significant lesion





Labs


Labs:





Laboratory Tests








Test


 1/31/22


12:18 1/31/22


14:58 1/31/22


16:35 2/1/22


04:50


 


Troponin I High Sensitivity 7 ng/L (4-50)   7 ng/L (4-50)  


 


Urine Color  Yellow   


 


Urine Clarity  Clear   


 


Urine pH  7.5 (<5.0-8.0)   


 


Urine Specific Gravity


 


 1.015


(1.000-1.030) 


 





 


Urine Protein


 


 Negative mg/dL


(NEG-TRACE) 


 





 


Urine Glucose (UA)


 


 Negative mg/dL


(NEG) 


 





 


Urine Ketones (Stick)  15 mg/dL (NEG)   


 


Urine Blood  Negative (NEG)   


 


Urine Nitrite  Negative (NEG)   


 


Urine Bilirubin  Negative (NEG)   


 


Urine Urobilinogen Dipstick


 


 1.0 mg/dL (0.2


mg/dL) 


 





 


Urine Leukocyte Esterase  Trace (NEG)   


 


Urine RBC  1-2 /HPF (0-2)   


 


Urine WBC


 


 5-10 /HPF


(0-4) 


 





 


Urine Squamous Epithelial


Cells 


 Many /LPF 


 


 





 


Urine Bacteria


 


 Few /HPF


(0-FEW) 


 





 


Urine Mucus  Slight /LPF   


 


Urine Opiates Screen  Neg (NEG)   


 


Urine Methadone Screen  Neg (NEG)   


 


Urine Barbiturates  Neg (NEG)   


 


Urine Phencyclidine Screen  Neg (NEG)   


 


Urine


Amphetamine/Methamphetamine 


 Neg (NEG) 


 


 





 


Urine Benzodiazepines Screen  Neg (NEG)   


 


Urine Cocaine Screen  Neg (NEG)   


 


Urine Cannabinoids Screen  Neg (NEG)   


 


Urine Ethyl Alcohol  Neg (NEG)   


 


Thyroid Stimulating Hormone


(TSH) 


 


 0.293 uIU/mL


(0.358-3.74) 





 


Sodium Level


 


 


 


 140 mmol/L


(136-145)


 


Potassium Level


 


 


 


 3.6 mmol/L


(3.5-5.1)


 


Chloride Level


 


 


 


 105 mmol/L


()


 


Carbon Dioxide Level


 


 


 


 26 mmol/L


(21-32)


 


Anion Gap    9 (6-14) 


 


Blood Urea Nitrogen    8 mg/dL (7-20) 


 


Creatinine


 


 


 


 0.7 mg/dL


(0.6-1.0)


 


Estimated GFR


(Cockcroft-Gault) 


 


 


 108.1 





 


Glucose Level


 


 


 


 97 mg/dL


(70-99)


 


Calcium Level


 


 


 


 8.0 mg/dL


(8.5-10.1)


 


Magnesium Level


 


 


 


 2.0 mg/dL


(1.8-2.4)


 


Triglycerides Level


 


 


 


 94 mg/dL


(0-150)


 


Cholesterol Level


 


 


 


 172 mg/dL


(0-200)


 


LDL Cholesterol, Calculated


 


 


 


 96 mg/dL


(0-100)


 


VLDL Cholesterol, Calculated


 


 


 


 19 mg/dL


(0-40)


 


Non-HDL Cholesterol Calculated


 


 


 


 115 mg/dL


(0-129)


 


HDL Cholesterol


 


 


 


 57 mg/dL


(40-60)


 


Cholesterol/HDL Ratio    3.0 











Assessment and Plan


Assessmemt and Plan


Problems


Medical Problems:


(1) Chest pain


Status: Acute  





(2) Hypokalemia


Status: Acute  





(3) Hypomagnesemia


Status: Acute  











Comment


Review of Relevant


I have reviewed the following items tej (where applicable) has been applied.


Medications:





Current Medications








 Medications


  (Trade)  Dose


 Ordered  Sig/Hilaria


 Route


 PRN Reason  Start Time


 Stop Time Status Last Admin


Dose Admin


 


 Potassium Chloride


  (Klor-Con)  20 meq  [id number]


 PO


   2/1/22 08:00


    2/1/22 08:37





 


 Potassium Chloride


  (Klor-Con)  40 meq  1X  ONCE


 PO


   1/31/22 17:00


 1/31/22 17:01 DC 1/31/22 20:46





 


 Vitamin B Complex


  (Folbic Tablet)  1 tab  DAILY


 PO


   2/1/22 09:00


    2/1/22 08:37





 


 Famotidine


  (Pepcid)  20 mg  QHS


 PO


   1/31/22 21:00


    1/31/22 20:45





 


 Enoxaparin Sodium


  (Lovenox 40mg


 Syringe)  40 mg  Q24H


 SQ


   1/31/22 21:00


    1/31/22 20:48





 


 Acetaminophen


  (Tylenol)  650 mg  PRN Q6HRS  PRN


 PO


 MILD PAIN / TEMP > 100.3'F  1/31/22 20:30


    1/31/22 20:45





 


 Amlodipine


 Besylate


  (Norvasc)  5 mg  QHS


 PO


   1/31/22 21:00


    1/31/22 20:45














Justifications for Admission


Other Justification














ODIN CANTRELL MD         Feb 1, 2022 11:44

## 2022-02-01 NOTE — NUR
Discharged patient to home. Discharge instructions given. piv and heart monitor removed. 
Escorted patient off unit per wheelchair into a private vehicle.